# Patient Record
Sex: FEMALE | Race: WHITE | NOT HISPANIC OR LATINO | Employment: OTHER | URBAN - METROPOLITAN AREA
[De-identification: names, ages, dates, MRNs, and addresses within clinical notes are randomized per-mention and may not be internally consistent; named-entity substitution may affect disease eponyms.]

---

## 2023-03-28 ENCOUNTER — TELEPHONE (OUTPATIENT)
Dept: PRIMARY CARE | Facility: CLINIC | Age: 83
End: 2023-03-28
Payer: COMMERCIAL

## 2023-08-22 ENCOUNTER — OFFICE VISIT (OUTPATIENT)
Dept: PRIMARY CARE | Facility: CLINIC | Age: 83
End: 2023-08-22
Payer: MEDICARE

## 2023-08-22 VITALS
SYSTOLIC BLOOD PRESSURE: 160 MMHG | RESPIRATION RATE: 16 BRPM | DIASTOLIC BLOOD PRESSURE: 74 MMHG | WEIGHT: 157.4 LBS | TEMPERATURE: 97.2 F | BODY MASS INDEX: 27.89 KG/M2 | HEART RATE: 68 BPM | OXYGEN SATURATION: 97 % | HEIGHT: 63 IN

## 2023-08-22 DIAGNOSIS — G89.29 CHRONIC LEFT SHOULDER PAIN: ICD-10-CM

## 2023-08-22 DIAGNOSIS — M85.80 OSTEOPENIA, UNSPECIFIED LOCATION: ICD-10-CM

## 2023-08-22 DIAGNOSIS — Z00.00 ROUTINE GENERAL MEDICAL EXAMINATION AT HEALTH CARE FACILITY: ICD-10-CM

## 2023-08-22 DIAGNOSIS — M25.541 ARTHRALGIA OF BOTH HANDS: ICD-10-CM

## 2023-08-22 DIAGNOSIS — G89.29 CHRONIC RIGHT SHOULDER PAIN: ICD-10-CM

## 2023-08-22 DIAGNOSIS — M25.512 CHRONIC LEFT SHOULDER PAIN: ICD-10-CM

## 2023-08-22 DIAGNOSIS — E80.21 ACUTE INTERMITTENT (HEPATIC) PORPHYRIA (MULTI): Primary | ICD-10-CM

## 2023-08-22 DIAGNOSIS — I10 PRIMARY HYPERTENSION: ICD-10-CM

## 2023-08-22 DIAGNOSIS — R41.3 MEMORY LOSS: ICD-10-CM

## 2023-08-22 DIAGNOSIS — E78.2 MIXED HYPERLIPIDEMIA: ICD-10-CM

## 2023-08-22 DIAGNOSIS — Z00.00 MEDICARE ANNUAL WELLNESS VISIT, SUBSEQUENT: ICD-10-CM

## 2023-08-22 DIAGNOSIS — F32.1 CURRENT MODERATE EPISODE OF MAJOR DEPRESSIVE DISORDER WITHOUT PRIOR EPISODE (MULTI): ICD-10-CM

## 2023-08-22 DIAGNOSIS — M25.542 ARTHRALGIA OF BOTH HANDS: ICD-10-CM

## 2023-08-22 DIAGNOSIS — M25.511 CHRONIC RIGHT SHOULDER PAIN: ICD-10-CM

## 2023-08-22 PROBLEM — F41.9 ANXIETY: Status: ACTIVE | Noted: 2023-08-22

## 2023-08-22 PROBLEM — E87.5 HYPERKALEMIA: Status: ACTIVE | Noted: 2023-08-22

## 2023-08-22 PROBLEM — E78.5 HYPERLIPIDEMIA: Status: ACTIVE | Noted: 2021-08-31

## 2023-08-22 PROBLEM — F32.A DEPRESSION: Status: ACTIVE | Noted: 2023-08-22

## 2023-08-22 PROBLEM — M25.50 ARTHRALGIA: Status: ACTIVE | Noted: 2023-08-22

## 2023-08-22 PROBLEM — M79.7 FIBROMYALGIA: Status: ACTIVE | Noted: 2023-08-22

## 2023-08-22 PROCEDURE — 20610 DRAIN/INJ JOINT/BURSA W/O US: CPT | Performed by: FAMILY MEDICINE

## 2023-08-22 PROCEDURE — 1170F FXNL STATUS ASSESSED: CPT | Performed by: FAMILY MEDICINE

## 2023-08-22 PROCEDURE — 1160F RVW MEDS BY RX/DR IN RCRD: CPT | Performed by: FAMILY MEDICINE

## 2023-08-22 PROCEDURE — 3077F SYST BP >= 140 MM HG: CPT | Performed by: FAMILY MEDICINE

## 2023-08-22 PROCEDURE — 99213 OFFICE O/P EST LOW 20 MIN: CPT | Performed by: FAMILY MEDICINE

## 2023-08-22 PROCEDURE — 1159F MED LIST DOCD IN RCRD: CPT | Performed by: FAMILY MEDICINE

## 2023-08-22 PROCEDURE — 3078F DIAST BP <80 MM HG: CPT | Performed by: FAMILY MEDICINE

## 2023-08-22 PROCEDURE — 1036F TOBACCO NON-USER: CPT | Performed by: FAMILY MEDICINE

## 2023-08-22 RX ORDER — TRIAMCINOLONE ACETONIDE 40 MG/ML
2.5 INJECTION, SUSPENSION INTRA-ARTICULAR; INTRAMUSCULAR
Status: COMPLETED | OUTPATIENT
Start: 2023-08-22 | End: 2023-08-22

## 2023-08-22 RX ORDER — CYCLOBENZAPRINE HCL 5 MG
5 TABLET ORAL
COMMUNITY
Start: 2021-07-21

## 2023-08-22 RX ORDER — LIDOCAINE HYDROCHLORIDE 10 MG/ML
0.5 INJECTION INFILTRATION; PERINEURAL
Status: COMPLETED | OUTPATIENT
Start: 2023-08-22 | End: 2023-08-22

## 2023-08-22 RX ORDER — NABUMETONE 500 MG/1
500 TABLET, FILM COATED ORAL 2 TIMES DAILY
Qty: 180 TABLET | Refills: 3 | Status: SHIPPED | OUTPATIENT
Start: 2023-08-22 | End: 2024-08-21

## 2023-08-22 RX ORDER — LISINOPRIL 20 MG/1
20 TABLET ORAL DAILY
COMMUNITY
End: 2024-02-13 | Stop reason: SDUPTHER

## 2023-08-22 RX ORDER — DEXTROMETHORPHAN HYDROBROMIDE, GUAIFENESIN 5; 100 MG/5ML; MG/5ML
650 LIQUID ORAL EVERY 8 HOURS PRN
COMMUNITY

## 2023-08-22 RX ORDER — SIMVASTATIN 20 MG/1
20 TABLET, FILM COATED ORAL DAILY
COMMUNITY
End: 2024-02-13

## 2023-08-22 RX ORDER — MULTIVITAMIN
TABLET ORAL
COMMUNITY

## 2023-08-22 RX ADMIN — TRIAMCINOLONE ACETONIDE 2.5 MG: 40 INJECTION, SUSPENSION INTRA-ARTICULAR; INTRAMUSCULAR at 13:25

## 2023-08-22 RX ADMIN — LIDOCAINE HYDROCHLORIDE 0.5 ML: 10 INJECTION INFILTRATION; PERINEURAL at 13:23

## 2023-08-22 RX ADMIN — LIDOCAINE HYDROCHLORIDE 0.5 ML: 10 INJECTION INFILTRATION; PERINEURAL at 13:25

## 2023-08-22 RX ADMIN — TRIAMCINOLONE ACETONIDE 2.5 MG: 40 INJECTION, SUSPENSION INTRA-ARTICULAR; INTRAMUSCULAR at 13:23

## 2023-08-22 ASSESSMENT — PATIENT HEALTH QUESTIONNAIRE - PHQ9
1. LITTLE INTEREST OR PLEASURE IN DOING THINGS: NOT AT ALL
2. FEELING DOWN, DEPRESSED OR HOPELESS: NOT AT ALL
SUM OF ALL RESPONSES TO PHQ9 QUESTIONS 1 AND 2: 0
SUM OF ALL RESPONSES TO PHQ9 QUESTIONS 1 AND 2: 0
2. FEELING DOWN, DEPRESSED OR HOPELESS: NOT AT ALL
1. LITTLE INTEREST OR PLEASURE IN DOING THINGS: NOT AT ALL

## 2023-08-22 ASSESSMENT — ACTIVITIES OF DAILY LIVING (ADL)
MANAGING_FINANCES: INDEPENDENT
BATHING: INDEPENDENT
TAKING_MEDICATION: INDEPENDENT
DRESSING: INDEPENDENT
GROCERY_SHOPPING: INDEPENDENT
DOING_HOUSEWORK: INDEPENDENT
BATHING: INDEPENDENT
DRESSING: INDEPENDENT

## 2023-08-22 ASSESSMENT — ENCOUNTER SYMPTOMS
OCCASIONAL FEELINGS OF UNSTEADINESS: 0
DEPRESSION: 0
LOSS OF SENSATION IN FEET: 0

## 2023-08-22 ASSESSMENT — LIFESTYLE VARIABLES
HOW MANY STANDARD DRINKS CONTAINING ALCOHOL DO YOU HAVE ON A TYPICAL DAY: PATIENT DOES NOT DRINK
AUDIT-C TOTAL SCORE: 0
HOW OFTEN DO YOU HAVE A DRINK CONTAINING ALCOHOL: NEVER
HOW OFTEN DO YOU HAVE SIX OR MORE DRINKS ON ONE OCCASION: NEVER
SKIP TO QUESTIONS 9-10: 1

## 2023-08-22 NOTE — PROGRESS NOTES
"Subjective   Reason for Visit: Avani Abel is an 82 y.o. female here for a Medicare Wellness visit.     Past Medical, Surgical, and Family History reviewed and updated in chart.    Reviewed all medications by prescribing practitioner or clinical pharmacist (such as prescriptions, OTCs, herbal therapies and supplements) and documented in the medical record.    HPI    Patient Care Team:  Ninfa Lowry MD as PCP - General  Ninfa Lowry MD as PCP - [facility] [place] [place] [facility] Attributed Provider     Review of Systems    Objective   Vitals:  /74   Pulse 68   Temp 36.2 °C (97.2 °F) (Temporal)   Resp 16   Ht 1.605 m (5' 3.19\")   Wt 71.4 kg (157 lb 6.4 oz)   SpO2 97%   BMI 27.72 kg/m²       Physical Exam  Vitals reviewed.   Constitutional:       Appearance: Normal appearance.   HENT:      Head: Normocephalic and atraumatic.      Right Ear: Tympanic membrane normal.      Left Ear: Tympanic membrane normal.      Nose: Nose normal.      Mouth/Throat:      Mouth: Mucous membranes are moist.      Pharynx: Oropharynx is clear.   Eyes:      Extraocular Movements: Extraocular movements intact.      Conjunctiva/sclera: Conjunctivae normal.      Pupils: Pupils are equal, round, and reactive to light.   Cardiovascular:      Rate and Rhythm: Normal rate and regular rhythm.      Pulses: Normal pulses.      Heart sounds: Normal heart sounds.   Pulmonary:      Effort: Pulmonary effort is normal.      Breath sounds: Normal breath sounds.   Abdominal:      General: Abdomen is flat. Bowel sounds are normal.      Palpations: Abdomen is soft.   Musculoskeletal:         General: Normal range of motion.      Cervical back: Normal range of motion and neck supple.   Skin:     General: Skin is warm and dry.      Capillary Refill: Capillary refill takes less than 2 seconds.   Neurological:      General: No focal deficit present.      Mental Status: She is alert and oriented to person, place, and time.   Psychiatric:         Mood and " Affect: Mood normal.         Behavior: Behavior normal.         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 Assessment: stable on Rx         Relevant Orders    CBC and Auto Differential    Comprehensive Metabolic Panel    Lipid Panel    TSH with reflex to Free T4 if abnormal    Memory loss    Osteopenia    Medicare annual wellness visit, subsequent     Other Visit Diagnoses       Primary hypertension        Relevant Orders    CBC and Auto Differential    Comprehensive Metabolic Panel    Lipid Panel    TSH with reflex to Free T4 if abnormal    Routine general medical examination at health care facility              Patient ID: Avani Abel is a 82 y.o. female.    Joint Injection Large/Arthrocentesis: R glenohumeral on 8/22/2023 1:23 PM  Indications: pain  Details: 25 G needle, posterior approach  Medications: 2.5 mg triamcinolone acetonide 40 mg/mL; 0.5 mL lidocaine 10 mg/mL (1 %)  Consent was given by the patient. Immediately prior to procedure a time out was called to verify the correct patient, procedure, equipment, support staff and site/side marked as required. Patient was prepped and draped in the usual sterile fashion.       Joint Injection Large/Arthrocentesis: L glenohumeral on 8/22/2023 1:25 PM  Indications: pain  Details: 25 G needle, posterior approach  Medications: 2.5 mg triamcinolone acetonide 40 mg/mL; 0.5 mL lidocaine 10 mg/mL (1 %)  Outcome: tolerated well, no immediate complications  Procedure, treatment alternatives, risks and benefits explained, specific risks discussed. Consent was  given by the patient. Immediately prior to procedure a time out was called to verify the correct patient, procedure, equipment, support staff and site/side marked as required. Patient was prepped and draped in the usual sterile fashion.

## 2023-10-02 ENCOUNTER — LAB (OUTPATIENT)
Dept: LAB | Facility: LAB | Age: 83
End: 2023-10-02
Payer: MEDICARE

## 2023-10-02 DIAGNOSIS — E80.21 ACUTE INTERMITTENT (HEPATIC) PORPHYRIA (MULTI): ICD-10-CM

## 2023-10-02 DIAGNOSIS — I10 PRIMARY HYPERTENSION: ICD-10-CM

## 2023-10-02 DIAGNOSIS — M25.542 ARTHRALGIA OF BOTH HANDS: ICD-10-CM

## 2023-10-02 DIAGNOSIS — E78.2 MIXED HYPERLIPIDEMIA: ICD-10-CM

## 2023-10-02 DIAGNOSIS — M25.541 ARTHRALGIA OF BOTH HANDS: ICD-10-CM

## 2023-10-02 LAB
ALBUMIN SERPL BCP-MCNC: 4.2 G/DL (ref 3.4–5)
ALP SERPL-CCNC: 59 U/L (ref 33–136)
ALT SERPL W P-5'-P-CCNC: 18 U/L (ref 7–45)
ANION GAP SERPL CALC-SCNC: 13 MMOL/L (ref 10–20)
AST SERPL W P-5'-P-CCNC: 16 U/L (ref 9–39)
BASOPHILS # BLD AUTO: 0.07 X10*3/UL (ref 0–0.1)
BASOPHILS NFR BLD AUTO: 0.8 %
BILIRUB SERPL-MCNC: 0.5 MG/DL (ref 0–1.2)
BUN SERPL-MCNC: 19 MG/DL (ref 6–23)
CALCIUM SERPL-MCNC: 9.8 MG/DL (ref 8.6–10.3)
CHLORIDE SERPL-SCNC: 105 MMOL/L (ref 98–107)
CHOLEST SERPL-MCNC: 172 MG/DL (ref 0–199)
CHOLESTEROL/HDL RATIO: 3.2
CO2 SERPL-SCNC: 29 MMOL/L (ref 21–32)
CREAT SERPL-MCNC: 1.06 MG/DL (ref 0.5–1.05)
CRP SERPL-MCNC: 1.2 MG/DL
EOSINOPHIL # BLD AUTO: 0.66 X10*3/UL (ref 0–0.4)
EOSINOPHIL NFR BLD AUTO: 7.6 %
ERYTHROCYTE [DISTWIDTH] IN BLOOD BY AUTOMATED COUNT: 12.8 % (ref 11.5–14.5)
ERYTHROCYTE [SEDIMENTATION RATE] IN BLOOD BY WESTERGREN METHOD: 62 MM/H (ref 0–30)
GFR SERPL CREATININE-BSD FRML MDRD: 53 ML/MIN/1.73M*2
GLUCOSE SERPL-MCNC: 90 MG/DL (ref 74–99)
HCT VFR BLD AUTO: 45.6 % (ref 36–46)
HDLC SERPL-MCNC: 53.2 MG/DL
HGB BLD-MCNC: 14.6 G/DL (ref 12–16)
IMM GRANULOCYTES # BLD AUTO: 0.05 X10*3/UL (ref 0–0.5)
IMM GRANULOCYTES NFR BLD AUTO: 0.6 % (ref 0–0.9)
LDLC SERPL CALC-MCNC: 80 MG/DL (ref 140–190)
LYMPHOCYTES # BLD AUTO: 1.71 X10*3/UL (ref 0.8–3)
LYMPHOCYTES NFR BLD AUTO: 19.6 %
MCH RBC QN AUTO: 29.5 PG (ref 26–34)
MCHC RBC AUTO-ENTMCNC: 32 G/DL (ref 32–36)
MCV RBC AUTO: 92 FL (ref 80–100)
MONOCYTES # BLD AUTO: 0.9 X10*3/UL (ref 0.05–0.8)
MONOCYTES NFR BLD AUTO: 10.3 %
NEUTROPHILS # BLD AUTO: 5.32 X10*3/UL (ref 1.6–5.5)
NEUTROPHILS NFR BLD AUTO: 61.1 %
NON HDL CHOLESTEROL: 119 MG/DL (ref 0–149)
NRBC BLD-RTO: 0.2 /100 WBCS (ref 0–0)
PLATELET # BLD AUTO: 354 X10*3/UL (ref 150–450)
PMV BLD AUTO: 9.1 FL (ref 7.5–11.5)
POTASSIUM SERPL-SCNC: 5.5 MMOL/L (ref 3.5–5.3)
PROT SERPL-MCNC: 7 G/DL (ref 6.4–8.2)
RBC # BLD AUTO: 4.95 X10*6/UL (ref 4–5.2)
RHEUMATOID FACT SER NEPH-ACNC: <10 IU/ML (ref 0–15)
SODIUM SERPL-SCNC: 141 MMOL/L (ref 136–145)
TRIGL SERPL-MCNC: 196 MG/DL (ref 0–149)
TSH SERPL-ACNC: 1.1 MIU/L (ref 0.44–3.98)
URATE SERPL-MCNC: 5.6 MG/DL (ref 2.3–6.7)
VLDL: 39 MG/DL (ref 0–40)
WBC # BLD AUTO: 8.7 X10*3/UL (ref 4.4–11.3)

## 2023-10-02 PROCEDURE — 85652 RBC SED RATE AUTOMATED: CPT

## 2023-10-02 PROCEDURE — 80050 GENERAL HEALTH PANEL: CPT

## 2023-10-02 PROCEDURE — 80061 LIPID PANEL: CPT

## 2023-10-02 PROCEDURE — 86140 C-REACTIVE PROTEIN: CPT

## 2023-10-02 PROCEDURE — 84550 ASSAY OF BLOOD/URIC ACID: CPT

## 2023-10-02 PROCEDURE — 36415 COLL VENOUS BLD VENIPUNCTURE: CPT

## 2023-10-04 LAB — ANA SER QL HEP2 SUBST: NEGATIVE

## 2023-10-11 NOTE — RESULT ENCOUNTER NOTE
Labs look good, except your markers for inflammation were up, but your testing for rheumatoid arthritis and lupus were negative. I would consider a referral to rheumatology to see if there is any additional testing warranted at this time. Would you be agreeable? Let me know!

## 2024-01-03 ENCOUNTER — OFFICE VISIT (OUTPATIENT)
Dept: PRIMARY CARE | Facility: CLINIC | Age: 84
End: 2024-01-03
Payer: MEDICARE

## 2024-01-03 VITALS
HEIGHT: 63 IN | HEART RATE: 97 BPM | RESPIRATION RATE: 16 BRPM | WEIGHT: 155.8 LBS | BODY MASS INDEX: 27.61 KG/M2 | OXYGEN SATURATION: 97 % | SYSTOLIC BLOOD PRESSURE: 142 MMHG | DIASTOLIC BLOOD PRESSURE: 72 MMHG

## 2024-01-03 DIAGNOSIS — E80.21 ACUTE INTERMITTENT (HEPATIC) PORPHYRIA (MULTI): ICD-10-CM

## 2024-01-03 DIAGNOSIS — M54.6 ACUTE RIGHT-SIDED THORACIC BACK PAIN: ICD-10-CM

## 2024-01-03 DIAGNOSIS — B02.23 POST-HERPETIC POLYNEUROPATHY: ICD-10-CM

## 2024-01-03 DIAGNOSIS — F32.1 CURRENT MODERATE EPISODE OF MAJOR DEPRESSIVE DISORDER WITHOUT PRIOR EPISODE (MULTI): ICD-10-CM

## 2024-01-03 DIAGNOSIS — B02.8 HERPES ZOSTER WITH OTHER COMPLICATION: Primary | ICD-10-CM

## 2024-01-03 DIAGNOSIS — N18.31 STAGE 3A CHRONIC KIDNEY DISEASE (MULTI): ICD-10-CM

## 2024-01-03 PROBLEM — B02.9 SHINGLES: Status: ACTIVE | Noted: 2024-01-03

## 2024-01-03 PROCEDURE — 1159F MED LIST DOCD IN RCRD: CPT | Performed by: FAMILY MEDICINE

## 2024-01-03 PROCEDURE — 99213 OFFICE O/P EST LOW 20 MIN: CPT | Performed by: FAMILY MEDICINE

## 2024-01-03 PROCEDURE — 1036F TOBACCO NON-USER: CPT | Performed by: FAMILY MEDICINE

## 2024-01-03 RX ORDER — VALACYCLOVIR HYDROCHLORIDE 1 G/1
1000 TABLET, FILM COATED ORAL 3 TIMES DAILY
COMMUNITY
Start: 2023-12-26

## 2024-01-03 RX ORDER — GABAPENTIN 100 MG/1
100 CAPSULE ORAL 3 TIMES DAILY
Qty: 90 CAPSULE | Refills: 0 | Status: SHIPPED | OUTPATIENT
Start: 2024-01-03 | End: 2024-02-02

## 2024-01-03 RX ORDER — OXYCODONE AND ACETAMINOPHEN 5; 325 MG/1; MG/1
1 TABLET ORAL EVERY 6 HOURS PRN
Qty: 20 TABLET | Refills: 0 | Status: SHIPPED | OUTPATIENT
Start: 2024-01-03 | End: 2024-01-08

## 2024-01-03 RX ORDER — PREDNISONE 20 MG/1
TABLET ORAL
Qty: 18 TABLET | Refills: 0 | OUTPATIENT
Start: 2024-01-03 | End: 2024-03-04

## 2024-01-03 ASSESSMENT — ENCOUNTER SYMPTOMS: BACK PAIN: 1

## 2024-01-03 NOTE — PATIENT INSTRUCTIONS
Finish Valtrex,taking more won't make things any different.   Prednisone taper. Hold meloxicam during this time, Your shoulders should feel fine with this medication,   Gabapentin 100 mg three times daily for nerve pain.   Percocet for five days.   You may put lidocaine cream on the areas that are tender, it is over the counter.     I would like to see you improve over the next few days, Please call if not!

## 2024-01-03 NOTE — PROGRESS NOTES
Subjective   Patient ID: Avani Abel is a 83 y.o. female.    Rash      83 year old female for follow up- she went to Urgent care 12./22 and was diagnosed with shingles and was given Valtrex. States pain in not good, under right breast , and in shoulder, No congestion. Weight stable last GFR 53. Pain 10/10  Review of Systems   Musculoskeletal:  Positive for back pain.   Skin:  Positive for rash.   Pain 10/10. Cannot sleep.     Objective   Physical Exam  Vitals reviewed.   Constitutional:       Appearance: Normal appearance.   HENT:      Head: Normocephalic and atraumatic.      Right Ear: Tympanic membrane normal.      Left Ear: Tympanic membrane normal.      Nose: Nose normal.      Mouth/Throat:      Mouth: Mucous membranes are moist.      Pharynx: Oropharynx is clear.   Eyes:      Extraocular Movements: Extraocular movements intact.      Conjunctiva/sclera: Conjunctivae normal.      Pupils: Pupils are equal, round, and reactive to light.   Cardiovascular:      Rate and Rhythm: Normal rate and regular rhythm.      Pulses: Normal pulses.      Heart sounds: Normal heart sounds.   Pulmonary:      Effort: Pulmonary effort is normal.      Breath sounds: Normal breath sounds.   Abdominal:      General: Abdomen is flat. Bowel sounds are normal.      Palpations: Abdomen is soft.   Musculoskeletal:         General: Normal range of motion.      Cervical back: Normal range of motion and neck supple.   Skin:     General: Skin is warm and dry.      Capillary Refill: Capillary refill takes less than 2 seconds.      Findings: Rash present.      Comments: Right side thoracic spine and under right breast radiating to nipple   Neurological:      General: No focal deficit present.      Mental Status: She is alert and oriented to person, place, and time.   Psychiatric:         Mood and Affect: Mood normal.         Behavior: Behavior normal.     Pain is 10/10    Assessment/Plan   Diagnoses and all orders for this visit:  Herpes  zoster with other complication  -     predniSONE (Deltasone) 20 mg tablet; Take 3 tabs (60mg) daily for 3 days, then take 2 tabs (40mg) daily for 3 days, then take 1 tab (20mg) daily for 3 days.  -     gabapentin (Neurontin) 100 mg capsule; Take 1 capsule (100 mg) by mouth 3 times a day.  -     oxyCODONE-acetaminophen (Percocet) 5-325 mg tablet; Take 1 tablet by mouth every 6 hours if needed for severe pain (7 - 10) for up to 5 days.  Post-herpetic polyneuropathy  -     predniSONE (Deltasone) 20 mg tablet; Take 3 tabs (60mg) daily for 3 days, then take 2 tabs (40mg) daily for 3 days, then take 1 tab (20mg) daily for 3 days.  -     gabapentin (Neurontin) 100 mg capsule; Take 1 capsule (100 mg) by mouth 3 times a day.  -     oxyCODONE-acetaminophen (Percocet) 5-325 mg tablet; Take 1 tablet by mouth every 6 hours if needed for severe pain (7 - 10) for up to 5 days.  Acute right-sided thoracic back pain  -     oxyCODONE-acetaminophen (Percocet) 5-325 mg tablet; Take 1 tablet by mouth every 6 hours if needed for severe pain (7 - 10) for up to 5 days.  Finish Valtrex,taking more won't make things any different.   Prednisone taper. Hold meloxicam during this time, Your shoulders should feel fine with this medication,   Gabapentin 100 mg three times daily for nerve pain.   Percocet for five days.   You may put lidocaine cream on the areas that are tender, it is over the counter.     I would like to see you improve over the next few days, Please call if not!

## 2024-02-13 ENCOUNTER — TELEPHONE (OUTPATIENT)
Dept: PRIMARY CARE | Facility: CLINIC | Age: 84
End: 2024-02-13
Payer: COMMERCIAL

## 2024-02-13 NOTE — TELEPHONE ENCOUNTER
Refill request:    Simvastatin  20mg every day  Lisinopril 20mg every day     Pharmacy: [facility]    She may want to cancel her 2/22 appointment, if she vasquez get these refilled. Please if there are any issues.

## 2024-02-13 NOTE — TELEPHONE ENCOUNTER
Patient requesting to switch appointment date and times with . Wants to make sure that's alright please advise     Hua: 4:20 2/19   Umm: 11:20 2/22   
no

## 2024-02-19 ENCOUNTER — APPOINTMENT (OUTPATIENT)
Dept: PRIMARY CARE | Facility: CLINIC | Age: 84
End: 2024-02-19
Payer: MEDICARE

## 2024-02-22 ENCOUNTER — APPOINTMENT (OUTPATIENT)
Dept: PRIMARY CARE | Facility: CLINIC | Age: 84
End: 2024-02-22
Payer: MEDICARE

## 2024-02-23 ENCOUNTER — APPOINTMENT (OUTPATIENT)
Dept: PRIMARY CARE | Facility: CLINIC | Age: 84
End: 2024-02-23
Payer: COMMERCIAL

## 2024-03-04 ENCOUNTER — TELEPHONE (OUTPATIENT)
Dept: PRIMARY CARE | Facility: CLINIC | Age: 84
End: 2024-03-04
Payer: COMMERCIAL

## 2024-03-04 ENCOUNTER — HOSPITAL ENCOUNTER (EMERGENCY)
Facility: HOSPITAL | Age: 84
Discharge: HOME | End: 2024-03-04
Payer: MEDICARE

## 2024-03-04 VITALS
BODY MASS INDEX: 25.61 KG/M2 | HEIGHT: 64 IN | WEIGHT: 150 LBS | HEART RATE: 67 BPM | RESPIRATION RATE: 18 BRPM | OXYGEN SATURATION: 97 % | TEMPERATURE: 98 F | DIASTOLIC BLOOD PRESSURE: 82 MMHG | SYSTOLIC BLOOD PRESSURE: 174 MMHG

## 2024-03-04 DIAGNOSIS — R21 RASH: Primary | ICD-10-CM

## 2024-03-04 PROCEDURE — 99283 EMERGENCY DEPT VISIT LOW MDM: CPT

## 2024-03-04 RX ORDER — PREDNISONE 20 MG/1
40 TABLET ORAL DAILY
Qty: 10 TABLET | Refills: 0 | Status: SHIPPED | OUTPATIENT
Start: 2024-03-04 | End: 2024-03-09

## 2024-03-04 RX ORDER — CETIRIZINE HYDROCHLORIDE 10 MG/1
10 TABLET ORAL DAILY
Qty: 5 TABLET | Refills: 0 | Status: SHIPPED | OUTPATIENT
Start: 2024-03-04 | End: 2024-03-09

## 2024-03-04 RX ORDER — FAMOTIDINE 20 MG/1
20 TABLET, FILM COATED ORAL 2 TIMES DAILY
Qty: 10 TABLET | Refills: 0 | Status: SHIPPED | OUTPATIENT
Start: 2024-03-04 | End: 2024-03-04 | Stop reason: SDUPTHER

## 2024-03-04 RX ORDER — FAMOTIDINE 20 MG/1
20 TABLET, FILM COATED ORAL 2 TIMES DAILY
Qty: 10 TABLET | Refills: 0 | Status: SHIPPED | OUTPATIENT
Start: 2024-03-04 | End: 2024-03-09

## 2024-03-04 ASSESSMENT — COLUMBIA-SUICIDE SEVERITY RATING SCALE - C-SSRS
6. HAVE YOU EVER DONE ANYTHING, STARTED TO DO ANYTHING, OR PREPARED TO DO ANYTHING TO END YOUR LIFE?: NO
1. IN THE PAST MONTH, HAVE YOU WISHED YOU WERE DEAD OR WISHED YOU COULD GO TO SLEEP AND NOT WAKE UP?: NO
2. HAVE YOU ACTUALLY HAD ANY THOUGHTS OF KILLING YOURSELF?: NO

## 2024-03-04 ASSESSMENT — PAIN SCALES - GENERAL: PAINLEVEL_OUTOF10: 0 - NO PAIN

## 2024-03-04 ASSESSMENT — PAIN DESCRIPTION - LOCATION: LOCATION: CHEST

## 2024-03-04 ASSESSMENT — PAIN - FUNCTIONAL ASSESSMENT: PAIN_FUNCTIONAL_ASSESSMENT: 0-10

## 2024-03-04 NOTE — ED PROVIDER NOTES
HPI   Chief Complaint   Patient presents with    Rash     Pt had a rash. Pt states it is now subsiding. Pt has Porphyria which may have caused this       This is a 83-year-old  female presenting to the emergency room with complaints of a rash.  The patient reports that she ate a piece of buttered banana bread and noticed that her face felt like it was burning.  She looked in the mirror and noted that was beet red.  The rash started to spread down her chest.  She felt like she was having a hard time breathing.  Her  reports that her symptoms have improved.  She did not take any medicines prior to arrival for her symptoms.  The patient does not have sensitive skin.  She denies eating anything new or different.  She denies any new perfumes, lotions, or detergents.  She reports she has not had any allergic reaction similar.                          No data recorded                   Patient History   Past Medical History:   Diagnosis Date    Other bursitis of elbow, right elbow 09/25/2017    Radiohumeral bursitis of both elbows    Other cervical disc degeneration, unspecified cervical region     Degeneration of cervical intervertebral disc    Other conditions influencing health status     Mammogram    Other intervertebral disc degeneration, lumbar region     Disc degeneration, lumbar    Pain in left hip 05/25/2016    Left hip pain     Past Surgical History:   Procedure Laterality Date    APPENDECTOMY  11/15/2013    Appendectomy    CHOLECYSTECTOMY  11/15/2013    Cholecystectomy    TONSILLECTOMY  11/15/2013    Tonsillectomy    TOTAL ABDOMINAL HYSTERECTOMY W/ BILATERAL SALPINGOOPHORECTOMY  11/15/2013    Total Abdominal Hysterectomy With Removal Of Both Ovaries    TOTAL KNEE ARTHROPLASTY  11/15/2013    Knee Replacement     No family history on file.  Social History     Tobacco Use    Smoking status: Never     Passive exposure: Current    Smokeless tobacco: Never   Vaping Use    Vaping Use: Never used    Substance Use Topics    Alcohol use: Not Currently    Drug use: Never       Physical Exam   ED Triage Vitals [03/04/24 1123]   Temperature Heart Rate Respirations BP   36.7 °C (98 °F) 97 (!) 22 (!) 195/101      Pulse Ox Temp src Heart Rate Source Patient Position   96 % -- -- Sitting      BP Location FiO2 (%)     Right arm --       Physical Exam  Vitals and nursing note reviewed.   Constitutional:       Appearance: Normal appearance. She is normal weight.   HENT:      Head: Normocephalic and atraumatic.      Right Ear: Tympanic membrane normal.      Left Ear: Tympanic membrane normal.      Nose: Nose normal.      Mouth/Throat:      Mouth: Mucous membranes are moist.      Pharynx: Oropharynx is clear.      Comments: No angioedema.  Airway is widely patent.  Eyes:      Extraocular Movements: Extraocular movements intact.      Conjunctiva/sclera: Conjunctivae normal.      Pupils: Pupils are equal, round, and reactive to light.   Cardiovascular:      Rate and Rhythm: Normal rate and regular rhythm.      Pulses: Normal pulses.   Pulmonary:      Effort: Pulmonary effort is normal.      Breath sounds: Normal breath sounds.   Genitourinary:     Comments: No CVA tenderness or pubic pain.  Musculoskeletal:         General: Normal range of motion.   Skin:     General: Skin is warm and dry.      Capillary Refill: Capillary refill takes less than 2 seconds.      Comments: The patient has mild erythema noted to the left cheek.   Neurological:      General: No focal deficit present.      Mental Status: She is alert and oriented to person, place, and time.   Psychiatric:         Mood and Affect: Mood normal.         Judgment: Judgment normal.         ED Course & MDM   Diagnoses as of 03/04/24 1307   Rash       Medical Decision Making  Patient was seen and evaluated by the nurse practitioner, Stephania Myo.  The patient is presenting to the emergency room with complaints of an allergic reaction.  The patient has some mild redness  noted to the left cheek.  Her  reports that her symptoms have improved dramatically.  The patient cannot name any specific trigger.  The patient is not showing any signs of anaphylaxis.  She was provided prescription for Zyrtec, prednisone, and Pepcid for home administration.  She was informed that she may need allergy testing if she continues to have persistent symptomatology.  The patient was discharged in stable condition with computer discharge instructions given.  She is to return if worse in any way.        Procedure  Procedures     MARIANO Dempsey-CNP  03/04/24 1971

## 2024-04-24 ENCOUNTER — APPOINTMENT (OUTPATIENT)
Dept: PRIMARY CARE | Facility: CLINIC | Age: 84
End: 2024-04-24
Payer: MEDICARE

## 2024-06-06 ENCOUNTER — OFFICE VISIT (OUTPATIENT)
Dept: PRIMARY CARE | Facility: CLINIC | Age: 84
End: 2024-06-06
Payer: MEDICARE

## 2024-06-06 VITALS
HEART RATE: 92 BPM | DIASTOLIC BLOOD PRESSURE: 80 MMHG | SYSTOLIC BLOOD PRESSURE: 142 MMHG | BODY MASS INDEX: 27.82 KG/M2 | HEIGHT: 63 IN | OXYGEN SATURATION: 96 % | WEIGHT: 157 LBS

## 2024-06-06 DIAGNOSIS — M25.511 CHRONIC RIGHT SHOULDER PAIN: ICD-10-CM

## 2024-06-06 DIAGNOSIS — G89.29 CHRONIC LEFT SHOULDER PAIN: Primary | ICD-10-CM

## 2024-06-06 DIAGNOSIS — G89.29 CHRONIC RIGHT SHOULDER PAIN: ICD-10-CM

## 2024-06-06 DIAGNOSIS — M25.512 CHRONIC LEFT SHOULDER PAIN: Primary | ICD-10-CM

## 2024-06-06 PROCEDURE — 1160F RVW MEDS BY RX/DR IN RCRD: CPT

## 2024-06-06 PROCEDURE — 1159F MED LIST DOCD IN RCRD: CPT

## 2024-06-06 PROCEDURE — 20610 DRAIN/INJ JOINT/BURSA W/O US: CPT

## 2024-06-06 PROCEDURE — 99213 OFFICE O/P EST LOW 20 MIN: CPT

## 2024-06-06 PROCEDURE — 1157F ADVNC CARE PLAN IN RCRD: CPT

## 2024-06-06 RX ADMIN — TRIAMCINOLONE ACETONIDE 2.5 MG: 40 INJECTION, SUSPENSION INTRA-ARTICULAR; INTRAMUSCULAR at 17:48

## 2024-06-06 RX ADMIN — LIDOCAINE HYDROCHLORIDE 0.5 ML: 10 INJECTION INFILTRATION; PERINEURAL at 17:48

## 2024-06-06 ASSESSMENT — ENCOUNTER SYMPTOMS
LOSS OF SENSATION IN FEET: 0
OCCASIONAL FEELINGS OF UNSTEADINESS: 0
DEPRESSION: 0

## 2024-06-06 ASSESSMENT — PATIENT HEALTH QUESTIONNAIRE - PHQ9
2. FEELING DOWN, DEPRESSED OR HOPELESS: NOT AT ALL
1. LITTLE INTEREST OR PLEASURE IN DOING THINGS: NOT AT ALL
SUM OF ALL RESPONSES TO PHQ9 QUESTIONS 1 AND 2: 0

## 2024-06-06 NOTE — PROGRESS NOTES
"Patient ID: Avani Abel is a 83 y.o. female.  Patient in office for bilateral shoulder steroid injections.   Joint Injection Large/Arthrocentesis: bilateral glenohumeral on 6/6/2024 5:48 PM  Indications: pain  Details: 25 G needle, posterior approach  Medications (Right): 2.5 mg triamcinolone acetonide 40 mg/mL; 0.5 mL lidocaine 10 mg/mL (1 %)  Medications (Left): 2.5 mg triamcinolone acetonide 40 mg/mL; 0.5 mL lidocaine 10 mg/mL (1 %)    Patient tolerate right injection well, did state had 5/10 pain with left injection. ROM intact after injection.  Patient left stating pain was better.   Procedure, treatment alternatives, risks and benefits explained, specific risks discussed. Consent was given by the patient. Immediately prior to procedure a time out was called to verify the correct patient, procedure, equipment, support staff and site/side marked as required. Patient was prepped and draped in the usual sterile fashion.       Subjective   Patient ID: Avani Abel is a 83 y.o. female who presents for Injections (Bilateral shoulder steroid injections).    Past Medical, Surgical, and Family History reviewed and updated in chart.     Reviewed all medications by prescribing practitioner or clinical pharmacist (such as prescriptions, OTCs, herbal therapies and supplements) and documented in the medical record.    HPI   Patient in office for bilateral shoulder joint injection. Left shoulder worse then right.     Review of Systems   Musculoskeletal:  Positive for arthralgias.        Bilateral shoulder pain       Objective   /80   Pulse 92   Ht 1.6 m (5' 2.99\")   Wt 71.2 kg (157 lb)   SpO2 96%   BMI 27.82 kg/m²     Physical Exam  Musculoskeletal:      Left shoulder: Decreased range of motion.         Assessment/Plan   Problem List Items Addressed This Visit       Chronic left shoulder pain - Primary    Relevant Orders    Joint Injection Large/Arthrocentesis: bilateral glenohumeral    Chronic " right shoulder pain    Relevant Orders    Joint Injection Large/Arthrocentesis: bilateral glenohumeral

## 2024-06-07 RX ORDER — TRIAMCINOLONE ACETONIDE 40 MG/ML
2.5 INJECTION, SUSPENSION INTRA-ARTICULAR; INTRAMUSCULAR
Status: COMPLETED | OUTPATIENT
Start: 2024-06-06 | End: 2024-06-06

## 2024-06-07 RX ORDER — LIDOCAINE HYDROCHLORIDE 10 MG/ML
0.5 INJECTION INFILTRATION; PERINEURAL
Status: COMPLETED | OUTPATIENT
Start: 2024-06-06 | End: 2024-06-06

## 2024-06-07 RX ORDER — TRIAMCINOLONE ACETONIDE 40 MG/ML
40 INJECTION, SUSPENSION INTRA-ARTICULAR; INTRAMUSCULAR ONCE
Status: SHIPPED | OUTPATIENT
Start: 2024-06-07

## 2024-06-07 ASSESSMENT — ENCOUNTER SYMPTOMS: ARTHRALGIAS: 1

## 2024-08-22 PROBLEM — M50.30 DEGENERATION OF INTERVERTEBRAL DISC OF CERVICAL REGION: Status: ACTIVE | Noted: 2024-08-22

## 2024-08-22 PROBLEM — I10 HYPERTENSION: Status: ACTIVE | Noted: 2023-10-02

## 2024-08-22 PROBLEM — H65.21 RIGHT CHRONIC SEROUS OTITIS MEDIA: Status: ACTIVE | Noted: 2024-08-22

## 2024-08-22 PROBLEM — L98.9 LESION OF FACE: Status: ACTIVE | Noted: 2024-08-22

## 2024-08-22 PROBLEM — R20.0 NUMBNESS OF FINGERS OF BOTH HANDS: Status: ACTIVE | Noted: 2024-08-22

## 2024-08-22 PROBLEM — M17.12 OSTEOARTHRITIS OF LEFT KNEE: Status: ACTIVE | Noted: 2024-08-22

## 2024-08-22 PROBLEM — H66.91 ACUTE RIGHT OTITIS MEDIA: Status: ACTIVE | Noted: 2024-08-22

## 2024-08-22 PROBLEM — H69.90 EUSTACHIAN TUBE DYSFUNCTION: Status: ACTIVE | Noted: 2024-08-22

## 2024-08-22 PROBLEM — Z96.652 HISTORY OF KNEE REPLACEMENT, TOTAL, LEFT: Status: ACTIVE | Noted: 2024-08-22

## 2024-08-22 PROBLEM — G47.00 INSOMNIA: Status: ACTIVE | Noted: 2024-08-22

## 2024-08-22 PROBLEM — M54.50 LOW BACK PAIN SYNDROME: Status: ACTIVE | Noted: 2024-08-22

## 2024-08-22 PROBLEM — T75.3XXA MOTION SICKNESS: Status: ACTIVE | Noted: 2024-08-22

## 2024-08-22 PROBLEM — H69.90 DYSFUNCTION OF EUSTACHIAN TUBE: Status: ACTIVE | Noted: 2024-08-22

## 2024-08-22 PROBLEM — B96.89 ACUTE BACTERIAL SINUSITIS: Status: ACTIVE | Noted: 2024-08-22

## 2024-08-22 PROBLEM — L98.9 FACIAL LESION: Status: ACTIVE | Noted: 2024-08-22

## 2024-08-22 PROBLEM — M75.50 BURSITIS OF SHOULDER: Status: ACTIVE | Noted: 2024-08-22

## 2024-08-22 PROBLEM — H60.90 OTITIS EXTERNA: Status: ACTIVE | Noted: 2024-08-22

## 2024-08-22 PROBLEM — H26.9 CATARACT, RIGHT: Status: ACTIVE | Noted: 2024-08-22

## 2024-08-22 PROBLEM — L60.0 INGROWN NAIL OF GREAT TOE OF RIGHT FOOT: Status: ACTIVE | Noted: 2024-08-22

## 2024-08-22 PROBLEM — M62.838 MUSCLE SPASM: Status: ACTIVE | Noted: 2024-08-22

## 2024-08-22 PROBLEM — J01.90 ACUTE BACTERIAL SINUSITIS: Status: ACTIVE | Noted: 2024-08-22

## 2024-08-22 PROBLEM — I10 PRIMARY HYPERTENSION: Status: ACTIVE | Noted: 2021-08-31

## 2024-08-23 ENCOUNTER — TELEPHONE (OUTPATIENT)
Dept: PRIMARY CARE | Facility: CLINIC | Age: 84
End: 2024-08-23
Payer: COMMERCIAL

## 2024-08-23 DIAGNOSIS — N18.31 STAGE 3A CHRONIC KIDNEY DISEASE (MULTI): ICD-10-CM

## 2024-08-23 DIAGNOSIS — E78.2 MIXED HYPERLIPIDEMIA: ICD-10-CM

## 2024-08-23 RX ORDER — SIMVASTATIN 20 MG/1
20 TABLET, FILM COATED ORAL DAILY
Qty: 90 TABLET | Refills: 0 | Status: SHIPPED | OUTPATIENT
Start: 2024-08-23

## 2024-08-23 RX ORDER — LISINOPRIL 20 MG/1
20 TABLET ORAL DAILY
Qty: 90 TABLET | Refills: 0 | Status: SHIPPED | OUTPATIENT
Start: 2024-08-23

## 2024-08-23 NOTE — TELEPHONE ENCOUNTER
Rx Refill Request Telephone Encounter    Name:  Avani Abel  :  [id number]  Medication Name:  lisinopril  20 mg        Take 1 tablet by mouth once daily  Specific Pharmacy location:  [facility]  Date of last appointment:  2024  Rx Refill Request Telephone Encounter    Name:  Avani Abel  :  [id number]  Medication Name:  Simvastatin  20 mg        Take 1 tablet by mouth once daily

## 2024-08-23 NOTE — TELEPHONE ENCOUNTER
Patient had to cancel her 8/26 [facility] [place] appointment and her daughter wasn't happy about next appointment available is in December. She said that's not acceptable and she needs to be seen sooner. I told her I would send message to you to see when I can put her on your schedule. Thanks!

## 2024-08-26 ENCOUNTER — APPOINTMENT (OUTPATIENT)
Dept: PRIMARY CARE | Facility: CLINIC | Age: 84
End: 2024-08-26
Payer: MEDICARE

## 2024-08-30 NOTE — TELEPHONE ENCOUNTER
Avani called back . She is scheduled 12-2-24 for medicare wellness & appointment 9/13 with [facility] for lump on collarbone

## 2024-09-11 DIAGNOSIS — M25.511 PAIN OF BOTH SHOULDER JOINTS: ICD-10-CM

## 2024-09-11 DIAGNOSIS — G89.29 CHRONIC LEFT SHOULDER PAIN: Primary | ICD-10-CM

## 2024-09-11 DIAGNOSIS — M25.512 CHRONIC LEFT SHOULDER PAIN: Primary | ICD-10-CM

## 2024-09-11 DIAGNOSIS — M25.512 PAIN OF BOTH SHOULDER JOINTS: ICD-10-CM

## 2024-09-11 DIAGNOSIS — G89.29 CHRONIC RIGHT SHOULDER PAIN: ICD-10-CM

## 2024-09-11 DIAGNOSIS — M25.511 CHRONIC RIGHT SHOULDER PAIN: ICD-10-CM

## 2024-09-13 ENCOUNTER — APPOINTMENT (OUTPATIENT)
Dept: PRIMARY CARE | Facility: CLINIC | Age: 84
End: 2024-09-13
Payer: COMMERCIAL

## 2024-09-13 ENCOUNTER — LAB (OUTPATIENT)
Dept: LAB | Facility: LAB | Age: 84
End: 2024-09-13
Payer: MEDICARE

## 2024-09-13 ENCOUNTER — HOSPITAL ENCOUNTER (OUTPATIENT)
Dept: RADIOLOGY | Facility: HOSPITAL | Age: 84
Discharge: HOME | End: 2024-09-13
Payer: MEDICARE

## 2024-09-13 VITALS — OXYGEN SATURATION: 99 % | WEIGHT: 153.6 LBS | HEART RATE: 83 BPM | BODY MASS INDEX: 27.22 KG/M2

## 2024-09-13 DIAGNOSIS — Z13.29 SCREENING FOR HYPOTHYROIDISM: ICD-10-CM

## 2024-09-13 DIAGNOSIS — D51.1 VIT B12 DEFIC ANEMIA D/T SLCTV VIT B12 MALABSORP W PROTEIN: ICD-10-CM

## 2024-09-13 DIAGNOSIS — E78.2 MIXED HYPERLIPIDEMIA: ICD-10-CM

## 2024-09-13 DIAGNOSIS — R22.2 LUMP IN CHEST: ICD-10-CM

## 2024-09-13 DIAGNOSIS — N18.31 STAGE 3A CHRONIC KIDNEY DISEASE (MULTI): ICD-10-CM

## 2024-09-13 DIAGNOSIS — R73.01 IMPAIRED FASTING GLUCOSE: ICD-10-CM

## 2024-09-13 DIAGNOSIS — E55.9 VITAMIN D DEFICIENCY: ICD-10-CM

## 2024-09-13 DIAGNOSIS — F43.9 STRESS: ICD-10-CM

## 2024-09-13 DIAGNOSIS — R22.2 LUMP IN CHEST: Primary | ICD-10-CM

## 2024-09-13 LAB
25(OH)D3 SERPL-MCNC: 39 NG/ML (ref 30–100)
ALBUMIN SERPL BCP-MCNC: 4.5 G/DL (ref 3.4–5)
ALP SERPL-CCNC: 46 U/L (ref 33–136)
ALT SERPL W P-5'-P-CCNC: 14 U/L (ref 7–45)
ANION GAP SERPL CALC-SCNC: 11 MMOL/L (ref 10–20)
AST SERPL W P-5'-P-CCNC: 15 U/L (ref 9–39)
BILIRUB SERPL-MCNC: 0.6 MG/DL (ref 0–1.2)
BUN SERPL-MCNC: 15 MG/DL (ref 6–23)
CALCIUM SERPL-MCNC: 9.6 MG/DL (ref 8.6–10.3)
CHLORIDE SERPL-SCNC: 104 MMOL/L (ref 98–107)
CHOLEST SERPL-MCNC: 187 MG/DL (ref 0–199)
CHOLESTEROL/HDL RATIO: 2.9
CO2 SERPL-SCNC: 28 MMOL/L (ref 21–32)
CREAT SERPL-MCNC: 0.88 MG/DL (ref 0.5–1.05)
EGFRCR SERPLBLD CKD-EPI 2021: 66 ML/MIN/1.73M*2
ERYTHROCYTE [DISTWIDTH] IN BLOOD BY AUTOMATED COUNT: 12.3 % (ref 11.5–14.5)
EST. AVERAGE GLUCOSE BLD GHB EST-MCNC: 120 MG/DL
GLUCOSE SERPL-MCNC: 82 MG/DL (ref 74–99)
HBA1C MFR BLD: 5.8 %
HCT VFR BLD AUTO: 43.4 % (ref 36–46)
HDLC SERPL-MCNC: 63.4 MG/DL
HGB BLD-MCNC: 14.4 G/DL (ref 12–16)
LDLC SERPL CALC-MCNC: 79 MG/DL
MCH RBC QN AUTO: 30.1 PG (ref 26–34)
MCHC RBC AUTO-ENTMCNC: 33.2 G/DL (ref 32–36)
MCV RBC AUTO: 91 FL (ref 80–100)
NON HDL CHOLESTEROL: 124 MG/DL (ref 0–149)
NRBC BLD-RTO: 0 /100 WBCS (ref 0–0)
PLATELET # BLD AUTO: 306 X10*3/UL (ref 150–450)
POTASSIUM SERPL-SCNC: 4.1 MMOL/L (ref 3.5–5.3)
PROT SERPL-MCNC: 7.2 G/DL (ref 6.4–8.2)
RBC # BLD AUTO: 4.78 X10*6/UL (ref 4–5.2)
SODIUM SERPL-SCNC: 139 MMOL/L (ref 136–145)
TRIGL SERPL-MCNC: 225 MG/DL (ref 0–149)
TSH SERPL-ACNC: 0.75 MIU/L (ref 0.44–3.98)
VIT B12 SERPL-MCNC: 355 PG/ML (ref 211–911)
VLDL: 45 MG/DL (ref 0–40)
WBC # BLD AUTO: 7.7 X10*3/UL (ref 4.4–11.3)

## 2024-09-13 PROCEDURE — 1157F ADVNC CARE PLAN IN RCRD: CPT

## 2024-09-13 PROCEDURE — 1160F RVW MEDS BY RX/DR IN RCRD: CPT

## 2024-09-13 PROCEDURE — 71046 X-RAY EXAM CHEST 2 VIEWS: CPT

## 2024-09-13 PROCEDURE — 1036F TOBACCO NON-USER: CPT

## 2024-09-13 PROCEDURE — 99214 OFFICE O/P EST MOD 30 MIN: CPT

## 2024-09-13 PROCEDURE — 36415 COLL VENOUS BLD VENIPUNCTURE: CPT

## 2024-09-13 PROCEDURE — 1159F MED LIST DOCD IN RCRD: CPT

## 2024-09-13 RX ORDER — HYDROXYZINE HYDROCHLORIDE 10 MG/1
10 TABLET, FILM COATED ORAL 3 TIMES DAILY
Qty: 90 TABLET | Refills: 0 | Status: SHIPPED | OUTPATIENT
Start: 2024-09-13 | End: 2024-10-13

## 2024-09-13 RX ORDER — LISINOPRIL 40 MG/1
40 TABLET ORAL DAILY
Qty: 30 TABLET | Refills: 0 | Status: SHIPPED | OUTPATIENT
Start: 2024-09-13 | End: 2024-10-13

## 2024-09-13 ASSESSMENT — ENCOUNTER SYMPTOMS
MUSCULOSKELETAL NEGATIVE: 1
ALLERGIC/IMMUNOLOGIC NEGATIVE: 1
ENDOCRINE NEGATIVE: 1
HEMATOLOGIC/LYMPHATIC NEGATIVE: 1
GASTROINTESTINAL NEGATIVE: 1
RESPIRATORY NEGATIVE: 1
EYES NEGATIVE: 1
PSYCHIATRIC NEGATIVE: 1
NEUROLOGICAL NEGATIVE: 1
CARDIOVASCULAR NEGATIVE: 1
CONSTITUTIONAL NEGATIVE: 1

## 2024-09-13 NOTE — PROGRESS NOTES
Subjective   Patient ID: Avani Abel is a 83 y.o. female who presents for Follow-up (Lump on collarbone for a while with discomfort ).    HPI an 83-year-old female arrives to clinic with chief complaint of lump on collarbone.  The patient reports having the symptoms over the last couple of weeks.  She denies any falls or traumas however it does cause some discomfort.  She also reports that she has not seen her primary care provider in over a year.  She states that she has been busy taking care of her .  She reports having elevated blood pressures that has not been controlled with lisinopril 20 mg oral tablet daily.  She has not completed any blood work as well.  She is here for further evaluation health maintenance.    Review of Systems   Constitutional: Negative.    HENT: Negative.     Eyes: Negative.    Respiratory: Negative.     Cardiovascular: Negative.    Gastrointestinal: Negative.    Endocrine: Negative.    Genitourinary: Negative.    Musculoskeletal: Negative.    Skin: Negative.         Lump on skin/chest   Allergic/Immunologic: Negative.    Neurological: Negative.    Hematological: Negative.    Psychiatric/Behavioral: Negative.     All other systems reviewed and are negative.      Objective   Pulse 83   Wt 69.7 kg (153 lb 9.6 oz)   SpO2 99%   BMI 27.22 kg/m²     Physical Exam  Chest:          Comments: Lump        Assessment/Plan   Problem List Items Addressed This Visit       Hyperlipidemia    Relevant Orders    CBC    Hemoglobin A1C    Lipid Panel    Stage 3a chronic kidney disease (Multi)    Relevant Medications    lisinopril 40 mg tablet    Other Relevant Orders    Comprehensive Metabolic Panel    Follow Up In [facility] - PCP - Established     Other Visit Diagnoses       Lump in chest    -  Primary    Relevant Orders    XR chest 2 views    Follow Up In [facility] - PCP - Established    Screening for hypothyroidism        Relevant Orders    TSH with reflex to  Free T4 if abnormal    Vitamin D deficiency        Relevant Orders    Vitamin D 25-Hydroxy,Total (for eval of Vitamin D levels)    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    Two-view chest x-ray was ordered to rule out the lump on her chest.  We will call with any abnormal results.    Lab work ordered.  Please complete lab work prior to your next appointment in 1 month with your primary care provider.  We will adjust medications accordingly.    Blood pressure in the office today was 190/100.  Patient denies any dizziness, chest pain, shortness of breath.  Lisinopril 40 mg oral tablet daily was initiated today.  Blood pressure reading log as discussed.  Bring your blood pressure reading log to your next appointment.    For any worsening signs and symptoms, please head to the emergency department.    As a result of the work-up, the patient was discharged home.  she was informed of her diagnosis and instructed to come back with any concerns or worsening of condition.  she and was agreeable to the plan as discussed above.  she was given the opportunity to ask questions.  All of the patient's questions were answered.    This document was generated using the assistance of voice recognition software. If there are any errors of spelling, grammar, syntax, or meaning; please feel free to contact me directly for clarification.

## 2024-10-14 ENCOUNTER — APPOINTMENT (OUTPATIENT)
Dept: PRIMARY CARE | Facility: CLINIC | Age: 84
End: 2024-10-14
Payer: COMMERCIAL

## 2024-10-14 ENCOUNTER — TELEPHONE (OUTPATIENT)
Dept: PRIMARY CARE | Facility: CLINIC | Age: 84
End: 2024-10-14

## 2024-10-14 VITALS
RESPIRATION RATE: 16 BRPM | HEIGHT: 63 IN | SYSTOLIC BLOOD PRESSURE: 126 MMHG | OXYGEN SATURATION: 98 % | HEART RATE: 86 BPM | BODY MASS INDEX: 27.29 KG/M2 | WEIGHT: 154 LBS | DIASTOLIC BLOOD PRESSURE: 72 MMHG

## 2024-10-14 DIAGNOSIS — M79.7 FIBROMYALGIA: ICD-10-CM

## 2024-10-14 DIAGNOSIS — I10 PRIMARY HYPERTENSION: ICD-10-CM

## 2024-10-14 DIAGNOSIS — G89.29 CHRONIC RIGHT SHOULDER PAIN: ICD-10-CM

## 2024-10-14 DIAGNOSIS — R22.2 LUMP IN CHEST: ICD-10-CM

## 2024-10-14 DIAGNOSIS — Z00.00 MEDICARE ANNUAL WELLNESS VISIT, SUBSEQUENT: ICD-10-CM

## 2024-10-14 DIAGNOSIS — M25.512 CHRONIC LEFT SHOULDER PAIN: ICD-10-CM

## 2024-10-14 DIAGNOSIS — G89.29 CHRONIC LEFT SHOULDER PAIN: ICD-10-CM

## 2024-10-14 DIAGNOSIS — F43.9 STRESS: ICD-10-CM

## 2024-10-14 DIAGNOSIS — E78.2 MIXED HYPERLIPIDEMIA: ICD-10-CM

## 2024-10-14 DIAGNOSIS — F41.9 ANXIETY: ICD-10-CM

## 2024-10-14 DIAGNOSIS — Z00.00 ROUTINE GENERAL MEDICAL EXAMINATION AT HEALTH CARE FACILITY: Primary | ICD-10-CM

## 2024-10-14 DIAGNOSIS — G25.81 RESTLESS LEG: ICD-10-CM

## 2024-10-14 DIAGNOSIS — E80.21 ACUTE INTERMITTENT (HEPATIC) PORPHYRIA (MULTI): ICD-10-CM

## 2024-10-14 DIAGNOSIS — M25.511 CHRONIC RIGHT SHOULDER PAIN: ICD-10-CM

## 2024-10-14 PROBLEM — N18.31 STAGE 3A CHRONIC KIDNEY DISEASE (MULTI): Status: RESOLVED | Noted: 2024-01-03 | Resolved: 2024-10-14

## 2024-10-14 PROCEDURE — 1157F ADVNC CARE PLAN IN RCRD: CPT | Performed by: FAMILY MEDICINE

## 2024-10-14 PROCEDURE — 90662 IIV NO PRSV INCREASED AG IM: CPT | Performed by: FAMILY MEDICINE

## 2024-10-14 PROCEDURE — 1159F MED LIST DOCD IN RCRD: CPT | Performed by: FAMILY MEDICINE

## 2024-10-14 PROCEDURE — G0439 PPPS, SUBSEQ VISIT: HCPCS | Performed by: FAMILY MEDICINE

## 2024-10-14 PROCEDURE — G0008 ADMIN INFLUENZA VIRUS VAC: HCPCS | Performed by: FAMILY MEDICINE

## 2024-10-14 PROCEDURE — 1160F RVW MEDS BY RX/DR IN RCRD: CPT | Performed by: FAMILY MEDICINE

## 2024-10-14 PROCEDURE — 1170F FXNL STATUS ASSESSED: CPT | Performed by: FAMILY MEDICINE

## 2024-10-14 PROCEDURE — 3074F SYST BP LT 130 MM HG: CPT | Performed by: FAMILY MEDICINE

## 2024-10-14 PROCEDURE — 1036F TOBACCO NON-USER: CPT | Performed by: FAMILY MEDICINE

## 2024-10-14 PROCEDURE — 3078F DIAST BP <80 MM HG: CPT | Performed by: FAMILY MEDICINE

## 2024-10-14 PROCEDURE — 99214 OFFICE O/P EST MOD 30 MIN: CPT | Performed by: FAMILY MEDICINE

## 2024-10-14 RX ORDER — DULOXETIN HYDROCHLORIDE 20 MG/1
20 CAPSULE, DELAYED RELEASE ORAL DAILY
Qty: 30 CAPSULE | Refills: 5 | Status: SHIPPED | OUTPATIENT
Start: 2024-10-14 | End: 2025-04-12

## 2024-10-14 RX ORDER — HYDROXYZINE HYDROCHLORIDE 10 MG/1
10 TABLET, FILM COATED ORAL 3 TIMES DAILY
Qty: 270 TABLET | Refills: 3 | Status: SHIPPED | OUTPATIENT
Start: 2024-10-14 | End: 2025-10-09

## 2024-10-14 RX ORDER — CYCLOBENZAPRINE HCL 5 MG
5 TABLET ORAL AS NEEDED
Qty: 90 TABLET | Refills: 3 | Status: SHIPPED | OUTPATIENT
Start: 2024-10-14

## 2024-10-14 RX ORDER — ROPINIROLE 0.5 MG/1
0.5 TABLET, FILM COATED ORAL 3 TIMES DAILY
Qty: 30 TABLET | Refills: 5 | Status: SHIPPED | OUTPATIENT
Start: 2024-10-14 | End: 2025-10-14

## 2024-10-14 RX ORDER — MULTIVITAMIN/IRON/FOLIC ACID 18MG-0.4MG
1 TABLET ORAL DAILY
COMMUNITY

## 2024-10-14 RX ORDER — METOPROLOL SUCCINATE 50 MG/1
50 TABLET, EXTENDED RELEASE ORAL DAILY
Qty: 30 TABLET | Refills: 5 | Status: SHIPPED | OUTPATIENT
Start: 2024-10-14 | End: 2025-04-12

## 2024-10-14 RX ORDER — SIMVASTATIN 20 MG/1
20 TABLET, FILM COATED ORAL DAILY
Qty: 90 TABLET | Refills: 3 | Status: SHIPPED | OUTPATIENT
Start: 2024-10-14

## 2024-10-14 RX ORDER — LISINOPRIL 40 MG/1
40 TABLET ORAL DAILY
Qty: 90 TABLET | Refills: 3 | Status: SHIPPED | OUTPATIENT
Start: 2024-10-14 | End: 2025-10-09

## 2024-10-14 RX ORDER — PREDNISONE 20 MG/1
TABLET ORAL
Qty: 18 TABLET | Refills: 0 | Status: SHIPPED | OUTPATIENT
Start: 2024-10-14

## 2024-10-14 ASSESSMENT — ACTIVITIES OF DAILY LIVING (ADL)
TAKING_MEDICATION: INDEPENDENT
MANAGING_FINANCES: INDEPENDENT
GROCERY_SHOPPING: INDEPENDENT
DRESSING: INDEPENDENT
DOING_HOUSEWORK: INDEPENDENT
BATHING: INDEPENDENT

## 2024-10-14 ASSESSMENT — ANXIETY QUESTIONNAIRES
2. NOT BEING ABLE TO STOP OR CONTROL WORRYING: NOT AT ALL
5. BEING SO RESTLESS THAT IT IS HARD TO SIT STILL: NOT AT ALL
6. BECOMING EASILY ANNOYED OR IRRITABLE: NOT AT ALL
4. TROUBLE RELAXING: NOT AT ALL
3. WORRYING TOO MUCH ABOUT DIFFERENT THINGS: NOT AT ALL
1. FEELING NERVOUS, ANXIOUS, OR ON EDGE: NOT AT ALL
7. FEELING AFRAID AS IF SOMETHING AWFUL MIGHT HAPPEN: NOT AT ALL
IF YOU CHECKED OFF ANY PROBLEMS ON THIS QUESTIONNAIRE, HOW DIFFICULT HAVE THESE PROBLEMS MADE IT FOR YOU TO DO YOUR WORK, TAKE CARE OF THINGS AT HOME, OR GET ALONG WITH OTHER PEOPLE: NOT DIFFICULT AT ALL
GAD7 TOTAL SCORE: 0

## 2024-10-14 ASSESSMENT — PATIENT HEALTH QUESTIONNAIRE - PHQ9
2. FEELING DOWN, DEPRESSED OR HOPELESS: NOT AT ALL
SUM OF ALL RESPONSES TO PHQ9 QUESTIONS 1 AND 2: 0
1. LITTLE INTEREST OR PLEASURE IN DOING THINGS: NOT AT ALL

## 2024-10-14 ASSESSMENT — ENCOUNTER SYMPTOMS
DYSPHORIC MOOD: 1
LOSS OF SENSATION IN FEET: 0
OCCASIONAL FEELINGS OF UNSTEADINESS: 0
DEPRESSION: 0

## 2024-10-14 NOTE — ASSESSMENT & PLAN NOTE
Stable and controlled.  Labs done in July  Orders:    simvastatin (Zocor) 20 mg tablet; Take 1 tablet (20 mg) by mouth once daily.

## 2024-10-14 NOTE — ASSESSMENT & PLAN NOTE
Orders:    predniSONE (Deltasone) 20 mg tablet; Take 3 tabs (60mg) daily for 3 days, then take 2 tabs (40mg) daily for 3 days, then take 1 tab (20mg) daily for 3 days.

## 2024-10-14 NOTE — ASSESSMENT & PLAN NOTE
Orders:    predniSONE (Deltasone) 20 mg tablet; Take 3 tabs (60mg) daily for 3 days, then take 2 tabs (40mg) daily for 3 days, then take 1 tab (20mg) daily for 3 days.  Will try this instead of injections.

## 2024-10-14 NOTE — PROGRESS NOTES
"Subjective   Reason for Visit: Avani Abel is an 83 y.o. female here for a Medicare Wellness visit.     Past Medical, Surgical, and Family History reviewed and updated in chart.    Reviewed all medications by prescribing practitioner or clinical pharmacist (such as prescriptions, OTCs, herbal therapies and supplements) and documented in the medical record.    DONIS Salomon is here for follow up, did see RJ last month and had clavicular prominence, CXR looked good. Saw RJ for anxiety, also having bladder leakiage  Patient Care Team:  Ninfa Lowry MD as PCP - General (Family Medicine)  Ninfa Lowry MD as PCP - [facility] Attributed Provider     Review of Systems   Psychiatric/Behavioral:  Positive for dysphoric mood.         Stressors with ill .    All other systems reviewed and are negative.      Objective   Vitals:  /72   Pulse 86   Resp 16   Ht 1.6 m (5' 3\")   Wt 69.9 kg (154 lb)   SpO2 98%   BMI 27.28 kg/m²       Physical Exam  Vitals reviewed.   Constitutional:       Appearance: Normal appearance.   HENT:      Head: Normocephalic and atraumatic.      Right Ear: Tympanic membrane normal.      Left Ear: Tympanic membrane normal.      Nose: Nose normal.      Mouth/Throat:      Mouth: Mucous membranes are moist.      Pharynx: Oropharynx is clear.   Eyes:      Extraocular Movements: Extraocular movements intact.      Conjunctiva/sclera: Conjunctivae normal.      Pupils: Pupils are equal, round, and reactive to light.   Cardiovascular:      Rate and Rhythm: Normal rate and regular rhythm.      Pulses: Normal pulses.      Heart sounds: Normal heart sounds.   Pulmonary:      Effort: Pulmonary effort is normal.      Breath sounds: Normal breath sounds.   Abdominal:      General: Abdomen is flat. Bowel sounds are normal.      Palpations: Abdomen is soft.   Musculoskeletal:         General: Normal range of motion.      Cervical back: Normal range of motion and neck supple.   Skin:     " General: Skin is warm and dry.      Capillary Refill: Capillary refill takes less than 2 seconds.   Neurological:      General: No focal deficit present.      Mental Status: She is alert and oriented to person, place, and time.   Psychiatric:         Mood and Affect: Mood normal.         Behavior: Behavior normal.         Assessment & Plan  Lump in chest  X-ray was unremarkable, has clavicular prominence.   Orders:    Follow [facility] - PCP - Established    Mixed hyperlipidemia  Stable and controlled.  Labs done in July  Orders:    simvastatin (Zocor) 20 mg tablet; Take 1 tablet (20 mg) by mouth once daily.    Stress  Is a caregiver for her .   Orders:    hydrOXYzine HCL (Atarax) 10 mg tablet; Take 1 tablet (10 mg) by mouth 3 times a day.    Routine general medical examination at health care facility    Orders:    1 Year Follow Up In [facility] - PCP - Wellness Exam; Future    1 Year Follow Up In [facility] - PCP - Wellness Exam    Primary hypertension    Orders:    lisinopril 40 mg tablet; Take 1 tablet (40 mg) by mouth once daily.    metoprolol succinate XL (Toprol-XL) 50 mg 24 hr tablet; Take 1 tablet (50 mg) by mouth once daily. Do not crush or chew.    Follow Up [facility] - PCP - Established; Future    Follow Up In [facility] - PCP - Established    Acute intermittent (hepatic) porphyria (Multi)  No NSAIDS       Medicare annual wellness visit, subsequent         Anxiety  Hydroxyzine, for anxiety   Orders:    DULoxetine (Cymbalta) 20 mg DR capsule; Take 1 capsule (20 mg) by mouth once daily. Do not crush or chew.    Chronic left shoulder pain    Orders:    predniSONE (Deltasone) 20 mg tablet; Take 3 tabs (60mg) daily for 3 days, then take 2 tabs (40mg) daily for 3 days, then take 1 tab (20mg) daily for 3 days.    Chronic right shoulder pain    Orders:    predniSONE (Deltasone) 20 mg tablet; Take 3 tabs (60mg) daily for 3 days, then take 2  tabs (40mg) daily for 3 days, then take 1 tab (20mg) daily for 3 days.  Will try this instead of injections.   Fibromyalgia    Orders:    cyclobenzaprine (Flexeril) 5 mg tablet; Take 1 tablet (5 mg) by mouth if needed for muscle spasms.    Restless leg  Will try ropinorole for night time restless leg.   Orders:    rOPINIRole (Requip) 0.5 mg tablet; Take 1 tablet (0.5 mg) by mouth 3 times a day.

## 2024-10-14 NOTE — TELEPHONE ENCOUNTER
Pharmacy called and said that armond are reporting a drug interaction with the cyclobenzaprine and the hydroxyzine, it can cause dizziness and lightheadedness  and increase patients risk of falling.

## 2024-10-14 NOTE — ASSESSMENT & PLAN NOTE
Orders:    lisinopril 40 mg tablet; Take 1 tablet (40 mg) by mouth once daily.    metoprolol succinate XL (Toprol-XL) 50 mg 24 hr tablet; Take 1 tablet (50 mg) by mouth once daily. Do not crush or chew.    Follow Up In [facility] - PCP - Established; Future    Follow Up In [facility] - PCP - Established

## 2024-10-14 NOTE — PATIENT INSTRUCTIONS
We added duloxetine for anxiety and pain in addition to hydroxyzine.   We can send you to the bladder specialist if needed. Do not want to give bladder medication with the hydroxyzine  Do physical therapy with Levi  I sent a prednisone taper which will help with arthritis in all of your joints.   You will see Rheumatolgist next month  Watch your sweets!   I sent metoprolol to the pharmacy- please let me know what blood pressure readings are at home about three times per week for a  few weeks.   Let me know if you want Beta blocker or duloxetine sent for 90 days if they are working!   I also added ropinirole for your restless legs at night.   It was good to see you.

## 2024-10-14 NOTE — ASSESSMENT & PLAN NOTE
Orders:    cyclobenzaprine (Flexeril) 5 mg tablet; Take 1 tablet (5 mg) by mouth if needed for muscle spasms.

## 2024-10-14 NOTE — ASSESSMENT & PLAN NOTE
Hydroxyzine, for anxiety   Orders:    DULoxetine (Cymbalta) 20 mg DR capsule; Take 1 capsule (20 mg) by mouth once daily. Do not crush or chew.

## 2024-10-16 ENCOUNTER — APPOINTMENT (OUTPATIENT)
Dept: PRIMARY CARE | Facility: CLINIC | Age: 84
End: 2024-10-16
Payer: COMMERCIAL

## 2024-10-31 ENCOUNTER — TELEPHONE (OUTPATIENT)
Dept: PRIMARY CARE | Facility: CLINIC | Age: 84
End: 2024-10-31
Payer: COMMERCIAL

## 2024-10-31 DIAGNOSIS — G25.81 RESTLESS LEG: ICD-10-CM

## 2024-10-31 RX ORDER — ROPINIROLE 0.5 MG/1
0.5 TABLET, FILM COATED ORAL 3 TIMES DAILY
Qty: 270 TABLET | Refills: 3 | Status: SHIPPED | OUTPATIENT
Start: 2024-10-31 | End: 2025-10-31

## 2024-11-15 ENCOUNTER — HOSPITAL ENCOUNTER (OUTPATIENT)
Dept: RADIOLOGY | Facility: CLINIC | Age: 84
Discharge: HOME | End: 2024-11-15
Payer: MEDICARE

## 2024-11-15 ENCOUNTER — APPOINTMENT (OUTPATIENT)
Dept: RHEUMATOLOGY | Facility: CLINIC | Age: 84
End: 2024-11-15
Payer: COMMERCIAL

## 2024-11-15 ENCOUNTER — LAB (OUTPATIENT)
Dept: LAB | Facility: LAB | Age: 84
End: 2024-11-15
Payer: MEDICARE

## 2024-11-15 VITALS
BODY MASS INDEX: 27.11 KG/M2 | SYSTOLIC BLOOD PRESSURE: 145 MMHG | DIASTOLIC BLOOD PRESSURE: 88 MMHG | HEART RATE: 88 BPM | WEIGHT: 153 LBS | HEIGHT: 63 IN

## 2024-11-15 DIAGNOSIS — M19.90 ARTHRITIS: ICD-10-CM

## 2024-11-15 DIAGNOSIS — M25.512 PAIN OF BOTH SHOULDER JOINTS: ICD-10-CM

## 2024-11-15 DIAGNOSIS — M25.511 PAIN OF BOTH SHOULDER JOINTS: ICD-10-CM

## 2024-11-15 DIAGNOSIS — M19.90 ARTHRITIS: Primary | ICD-10-CM

## 2024-11-15 DIAGNOSIS — I10 PRIMARY HYPERTENSION: Primary | ICD-10-CM

## 2024-11-15 LAB
CCP IGG SERPL-ACNC: <1 U/ML
CRP SERPL-MCNC: 0.16 MG/DL
ERYTHROCYTE [SEDIMENTATION RATE] IN BLOOD BY WESTERGREN METHOD: 23 MM/H (ref 0–30)
HCV AB SER QL: NONREACTIVE
RHEUMATOID FACT SER NEPH-ACNC: <10 IU/ML (ref 0–15)
URATE SERPL-MCNC: 3.8 MG/DL (ref 2.3–6.7)

## 2024-11-15 PROCEDURE — 99204 OFFICE O/P NEW MOD 45 MIN: CPT | Performed by: INTERNAL MEDICINE

## 2024-11-15 PROCEDURE — 3077F SYST BP >= 140 MM HG: CPT | Performed by: INTERNAL MEDICINE

## 2024-11-15 PROCEDURE — 86431 RHEUMATOID FACTOR QUANT: CPT

## 2024-11-15 PROCEDURE — 1125F AMNT PAIN NOTED PAIN PRSNT: CPT | Performed by: INTERNAL MEDICINE

## 2024-11-15 PROCEDURE — 3079F DIAST BP 80-89 MM HG: CPT | Performed by: INTERNAL MEDICINE

## 2024-11-15 PROCEDURE — 86803 HEPATITIS C AB TEST: CPT

## 2024-11-15 PROCEDURE — 73030 X-RAY EXAM OF SHOULDER: CPT | Mod: 50

## 2024-11-15 PROCEDURE — 1157F ADVNC CARE PLAN IN RCRD: CPT | Performed by: INTERNAL MEDICINE

## 2024-11-15 PROCEDURE — 85652 RBC SED RATE AUTOMATED: CPT

## 2024-11-15 PROCEDURE — 73130 X-RAY EXAM OF HAND: CPT | Mod: 50

## 2024-11-15 PROCEDURE — 86140 C-REACTIVE PROTEIN: CPT

## 2024-11-15 PROCEDURE — 86200 CCP ANTIBODY: CPT

## 2024-11-15 PROCEDURE — 84550 ASSAY OF BLOOD/URIC ACID: CPT

## 2024-11-15 PROCEDURE — 36415 COLL VENOUS BLD VENIPUNCTURE: CPT

## 2024-11-15 RX ORDER — HYDRALAZINE HYDROCHLORIDE 10 MG/1
10 TABLET, FILM COATED ORAL 2 TIMES DAILY
Qty: 60 TABLET | Refills: 11 | Status: SHIPPED | OUTPATIENT
Start: 2024-11-15 | End: 2025-11-15

## 2024-11-15 ASSESSMENT — PAIN SCALES - GENERAL: PAINLEVEL_OUTOF10: 4

## 2024-11-15 NOTE — LETTER
November 15, 2024     Ninfa Lowry MD  [address] [facility] [employer]" [facility] [address]  [facility] [address]    Patient: Avani Abel   YOB: 1940   Date of Visit: 11/15/2024       Dear Dr. Ninfa Lowry MD:    Thank you for referring Avani Abel to me for evaluation. Below are my notes for this consultation.  If you have questions, please do not hesitate to call me. I look forward to following your patient along with you.       Sincerely,     Joe Fregoso MD      CC: No Recipients  ______________________________________________________________________________________    Subjective. Avani Abel is a 84 y.o. female who presents for Establish Care (Np- hand pain, joint pain,knee and shoulder pain ).    HPI.  84-year-old female with history of arthritis, FMS, porphyria, RLS, anxiety and s/p left TKR (2004) referred by primary care  physician Dr.Jessica JOSE Lowry MD in consultation for joint pain evaluation.    She has chronic pain in her hands and shoulders.  Hand pain gets worse after using or doing dishes.  She notes some knuckles are croak.  Shoulders hurt upon raising the arm above the head, lifting or doing other activities.  She rates joint pain up to 10/10 at times.  Left shoulder hurts more than right.  Left shoulder pain radiates towards his neck as well as to the lateral upper arm.  She has not noticed numbness and tingling in the arms or hands.  She has no trauma to the shoulders and hands.    She notes brief shoulder pain relief after intra-articular corticosteroid injections oral steroids.  She recently started Cymbalta and notes some pain relief.    She has no history of pleuritic chest pain, shortness of breath, fever, chills, night sweats, weight loss, dry eyes, dry mouth, skin rash,photosensitivity, mucocutaneous ulcers, hair loss, iritis, Raynaud's phenomena and inflammatory bowel disease.    Social history: She is  and lives with her  ".  She does not smoke or drink.  Family history.  She believes her mother has had arthritis.  Surgical history: S/p left TKR, cholecystectomy, hysterectomy and left carpal tunnel decompression surgery.  Right knee arthroscopy.    Review of Systems   All other systems reviewed and are negative.    Objective    Blood pressure 145/88, pulse 88, height 1.6 m (5' 3\"), weight 69.4 kg (153 lb).    Physical Exam.  Gen. AAO x3, NAD.  HEENT: No pallor or icterus, PERRLA, EOMI. Oropharynx is clear. MM moist,Parotid glands  not enlarged. No cervical lymphadenopathy .  Skin: No rashes.  Heart: S1, S2/ RRR. No murmurs or gallops.  Lungs: CTA B.  Abdomen: Soft, NT/ND, BS regular.  MSK: Bilateral [place] nodes and CMC squaring.  Diffuse pain of MCP, PIP, DIP and wrist without active synovitis.  Bilateral elbow without swelling and tenderness.  Bilateral shoulders with diffuse range of motion and diffuse pain without erythema, warmth or effusion.  Neck with good range of motion.  Right knee with patellofemoral crepitation.  Bilateral ankle and MTPs without swelling and tenderness.  Lumbar spine and SI joint without tenderness.  Neuro: CN II-XII intact. Sensation to touch intact.Strength 5/5 throughout. DTR 2+ and symmetrical.  Psych:Appropriate mood and behavior  EXT: No edema    Assessment/Plan. 84-year-old female with history of arthritis, FMS, porphyria, RLS, anxiety and s/p left TKR (2004) presented with chronic pain in her hands and shoulders.    After history, physical examination and review of available labs and imaging studies, it appears she most likely has degenerative arthritis, rotator cuff disease and subacromial bursitis and fibromyalgia.    We will obtain imaging studies and labs for further evaluation.  She has noticed some pain relief with Cymbalta.  Discussed to increase the dose after consulting primary care physician.  Patient was asked to take Tylenol 1 to 2 pills twice a day.  Referred to physical " therapy.    Follow-up in 2 months.     This note was partially generated using the Dragon Voice recognition system. There may be some incorrect wording, spelling and/or spelling errors or punctuation errors that were not corrected prior to committing the note to the medical record.      Problem List Items Addressed This Visit       Arthralgia    Relevant Orders    Citrulline Antibody, IgG    C-Reactive Protein    Uric Acid    Sedimentation Rate    Rheumatoid Factor    Hepatitis C Antibody    XR shoulder 2+ views bilateral    Referral to Physical Therapy     Other Visit Diagnoses       Arthritis    -  Primary    Relevant Orders    Citrulline Antibody, IgG    C-Reactive Protein    Uric Acid    Sedimentation Rate    Rheumatoid Factor    Hepatitis C Antibody    XR shoulder 2+ views bilateral    XR hand 3+ views bilateral    Referral to Physical Therapy                 Active Ambulatory Problems     Diagnosis Date Noted   • Acute intermittent (hepatic) porphyria (Multi) 08/22/2023   • Chronic left shoulder pain 08/22/2023   • Chronic right shoulder pain 08/22/2023   • Fibromyalgia 08/22/2023   • Depression 08/22/2023   • Anxiety 08/22/2023   • Hyperlipidemia 08/31/2021   • Hyperkalemia 08/22/2023   • Arthralgia 08/22/2023   • Memory loss 08/22/2023   • Osteopenia 08/22/2023   • Medicare annual wellness visit, subsequent 08/22/2023   • Post-herpetic polyneuropathy 01/03/2024   • Shingles 01/03/2024   • Current moderate episode of major depressive disorder without prior episode (Multi) 01/03/2024   • Acute bacterial sinusitis 08/22/2024   • Acute right otitis media 08/22/2024   • Bursitis of shoulder 08/22/2024   • Cataract, right 08/22/2024   • Degeneration of intervertebral disc of cervical region 08/22/2024   • Dysfunction of eustachian tube 08/22/2024   • Eustachian tube dysfunction 08/22/2024   • Facial lesion 08/22/2024   • Lesion of face 08/22/2024   • History of knee replacement, total, left 08/22/2024   •  Hypertension 10/02/2023   • Primary hypertension 08/31/2021   • Ingrown nail of great toe of right foot 08/22/2024   • Insomnia 08/22/2024   • Low back pain syndrome 08/22/2024   • Motion sickness 08/22/2024   • Muscle spasm 08/22/2024   • Numbness of fingers of both hands 08/22/2024   • Osteoarthritis of left knee 08/22/2024   • Otitis externa 08/22/2024   • Right chronic serous otitis media 08/22/2024     Resolved Ambulatory Problems     Diagnosis Date Noted   • Stage 3a chronic kidney disease (Multi) 01/03/2024     Past Medical History:   Diagnosis Date   • Other bursitis of elbow, right elbow 09/25/2017   • Other cervical disc degeneration, unspecified cervical region    • Other conditions influencing health status    • Other intervertebral disc degeneration, lumbar region    • Pain in left hip 05/25/2016       Family History   Problem Relation Name Age of Onset   • Diabetes Mother     • Thyroid cancer Mother     • Dementia Mother     • Breast cancer Sister     • Hashimoto's thyroiditis Sister     • Thyroid cancer Sister         Past Surgical History:   Procedure Laterality Date   • APPENDECTOMY  11/15/2013    Appendectomy   • CHOLECYSTECTOMY  11/15/2013    Cholecystectomy   • TONSILLECTOMY  11/15/2013    Tonsillectomy   • TOTAL ABDOMINAL HYSTERECTOMY W/ BILATERAL SALPINGOOPHORECTOMY  11/15/2013    Total Abdominal Hysterectomy With Removal Of Both Ovaries   • TOTAL KNEE ARTHROPLASTY  11/15/2013    Knee Replacement       Social History     Tobacco Use   Smoking Status Never   • Passive exposure: Current   Smokeless Tobacco Never       Allergies  Aspirin, Barbiturates, Chloroquine, Dye, Meloxicam, Shellfish derived, Sulfa (sulfonamide antibiotics), and Adhesive tape-silicones    Current Meds  Current Outpatient Medications   Medication Instructions   • acetaminophen (TYLENOL 8 HOUR) 650 mg, Every 8 hours PRN   • b complex 0.4 mg tablet 1 tablet, Daily   • cyclobenzaprine (FLEXERIL) 5 mg, oral, As needed   •  DULoxetine (CYMBALTA) 20 mg, oral, Daily, Do not crush or chew.   • hydrOXYzine HCL (ATARAX) 10 mg, oral, 3 times daily   • lisinopril 40 mg, oral, Daily   • metoprolol succinate XL (TOPROL-XL) 50 mg, oral, Daily, Do not crush or chew.   • multivitamin tablet Take by mouth.   • predniSONE (Deltasone) 20 mg tablet Take 3 tabs (60mg) daily for 3 days, then take 2 tabs (40mg) daily for 3 days, then take 1 tab (20mg) daily for 3 days.   • rOPINIRole (REQUIP) 0.5 mg, oral, 3 times daily   • simvastatin (ZOCOR) 20 mg, oral, Daily        Lab Results   Component Value Date    RF <10 10/02/2023    SEDRATE 62 (H) 10/02/2023    CRP 1.20 (H) 10/02/2023    URICACID 5.6 10/02/2023             Joe Fregoso MD

## 2024-11-15 NOTE — PATIENT INSTRUCTIONS
Consider increase Cymbalta.  Take Tylenol as discussed.  Begin shoulder physical therapy.  Follow-up in 2 months.

## 2024-11-15 NOTE — PROGRESS NOTES
"Subjective . Avani Abel is a 84 y.o. female who presents for Establish Care (Np- hand pain, joint pain,knee and shoulder pain ).    HPI.  84-year-old female with history of arthritis, FMS, porphyria, RLS, anxiety and s/p left TKR (2004) referred by primary care  physician Dr.Jessica JOSE Lowry MD in consultation for joint pain evaluation.    She has chronic pain in her hands and shoulders.  Hand pain gets worse after using or doing dishes.  She notes some knuckles are croak.  Shoulders hurt upon raising the arm above the head, lifting or doing other activities.  She rates joint pain up to 10/10 at times.  Left shoulder hurts more than right.  Left shoulder pain radiates towards his neck as well as to the lateral upper arm.  She has not noticed numbness and tingling in the arms or hands.  She has no trauma to the shoulders and hands.    She notes brief shoulder pain relief after intra-articular corticosteroid injections oral steroids.  She recently started Cymbalta and notes some pain relief.    She has no history of pleuritic chest pain, shortness of breath, fever, chills, night sweats, weight loss, dry eyes, dry mouth, skin rash,photosensitivity, mucocutaneous ulcers, hair loss, iritis, Raynaud's phenomena and inflammatory bowel disease.    Social history: She is  and lives with her .  She does not smoke or drink.  Family history.  She believes her mother has had arthritis.  Surgical history: S/p left TKR, cholecystectomy, hysterectomy and left carpal tunnel decompression surgery.  Right knee arthroscopy.    Review of Systems   All other systems reviewed and are negative.    Objective     Blood pressure 145/88, pulse 88, height 1.6 m (5' 3\"), weight 69.4 kg (153 lb).    Physical Exam.  Gen. AAO x3, NAD.  HEENT: No pallor or icterus, PERRLA, EOMI. Oropharynx is clear. MM moist,Parotid glands  not enlarged. No cervical lymphadenopathy .  Skin: No rashes.  Heart: S1, S2/ RRR. No murmurs or " gallops.  Lungs: CTA B.  Abdomen: Soft, NT/ND, BS regular.  MSK: Bilateral [place]ber[place] nodes and CMC squaring.  Diffuse pain of MCP, PIP, DIP and wrist without active synovitis.  Bilateral elbow without swelling and tenderness.  Bilateral shoulders with diffuse range of motion and diffuse pain without erythema, warmth or effusion.  Neck with good range of motion.  Right knee with patellofemoral crepitation.  Bilateral ankle and MTPs without swelling and tenderness.  Lumbar spine and SI joint without tenderness.  Neuro: CN II-XII intact. Sensation to touch intact.Strength 5/5 throughout. DTR 2+ and symmetrical.  Psych:Appropriate mood and behavior  EXT: No edema    Assessment/Plan . 84-year-old female with history of arthritis, FMS, porphyria, RLS, anxiety and s/p left TKR (2004) presented with chronic pain in her hands and shoulders.    After history, physical examination and review of available labs and imaging studies, it appears she most likely has degenerative arthritis, rotator cuff disease and subacromial bursitis and fibromyalgia.    We will obtain imaging studies and labs for further evaluation.  She has noticed some pain relief with Cymbalta.  Discussed to increase the dose after consulting primary care physician.  Patient was asked to take Tylenol 1 to 2 pills twice a day.  Referred to physical therapy.    Follow-up in 2 months.     This note was partially generated using the Dragon Voice recognition system. There may be some incorrect wording, spelling and/or spelling errors or punctuation errors that were not corrected prior to committing the note to the medical record.      Problem List Items Addressed This Visit       Arthralgia    Relevant Orders    Citrulline Antibody, IgG    C-Reactive Protein    Uric Acid    Sedimentation Rate    Rheumatoid Factor    Hepatitis C Antibody    XR shoulder 2+ views bilateral    Referral to Physical Therapy     Other Visit Diagnoses       Arthritis    -  Primary     Relevant Orders    Citrulline Antibody, IgG    C-Reactive Protein    Uric Acid    Sedimentation Rate    Rheumatoid Factor    Hepatitis C Antibody    XR shoulder 2+ views bilateral    XR hand 3+ views bilateral    Referral to Physical Therapy                 Active Ambulatory Problems     Diagnosis Date Noted    Acute intermittent (hepatic) porphyria (Multi) 08/22/2023    Chronic left shoulder pain 08/22/2023    Chronic right shoulder pain 08/22/2023    Fibromyalgia 08/22/2023    Depression 08/22/2023    Anxiety 08/22/2023    Hyperlipidemia 08/31/2021    Hyperkalemia 08/22/2023    Arthralgia 08/22/2023    Memory loss 08/22/2023    Osteopenia 08/22/2023    Medicare annual wellness visit, subsequent 08/22/2023    Post-herpetic polyneuropathy 01/03/2024    Shingles 01/03/2024    Current moderate episode of major depressive disorder without prior episode (Multi) 01/03/2024    Acute bacterial sinusitis 08/22/2024    Acute right otitis media 08/22/2024    Bursitis of shoulder 08/22/2024    Cataract, right 08/22/2024    Degeneration of intervertebral disc of cervical region 08/22/2024    Dysfunction of eustachian tube 08/22/2024    Eustachian tube dysfunction 08/22/2024    Facial lesion 08/22/2024    Lesion of face 08/22/2024    History of knee replacement, total, left 08/22/2024    Hypertension 10/02/2023    Primary hypertension 08/31/2021    Ingrown nail of great toe of right foot 08/22/2024    Insomnia 08/22/2024    Low back pain syndrome 08/22/2024    Motion sickness 08/22/2024    Muscle spasm 08/22/2024    Numbness of fingers of both hands 08/22/2024    Osteoarthritis of left knee 08/22/2024    Otitis externa 08/22/2024    Right chronic serous otitis media 08/22/2024     Resolved Ambulatory Problems     Diagnosis Date Noted    Stage 3a chronic kidney disease (Multi) 01/03/2024     Past Medical History:   Diagnosis Date    Other bursitis of elbow, right elbow 09/25/2017    Other cervical disc degeneration, unspecified  cervical region     Other conditions influencing health status     Other intervertebral disc degeneration, lumbar region     Pain in left hip 05/25/2016       Family History   Problem Relation Name Age of Onset    Diabetes Mother      Thyroid cancer Mother      Dementia Mother      Breast cancer Sister      Hashimoto's thyroiditis Sister      Thyroid cancer Sister         Past Surgical History:   Procedure Laterality Date    APPENDECTOMY  11/15/2013    Appendectomy    CHOLECYSTECTOMY  11/15/2013    Cholecystectomy    TONSILLECTOMY  11/15/2013    Tonsillectomy    TOTAL ABDOMINAL HYSTERECTOMY W/ BILATERAL SALPINGOOPHORECTOMY  11/15/2013    Total Abdominal Hysterectomy With Removal Of Both Ovaries    TOTAL KNEE ARTHROPLASTY  11/15/2013    Knee Replacement       Social History     Tobacco Use   Smoking Status Never    Passive exposure: Current   Smokeless Tobacco Never       Allergies  Aspirin, Barbiturates, Chloroquine, Dye, Meloxicam, Shellfish derived, Sulfa (sulfonamide antibiotics), and Adhesive tape-silicones    Current Meds  Current Outpatient Medications   Medication Instructions    acetaminophen (TYLENOL 8 HOUR) 650 mg, Every 8 hours PRN    b complex 0.4 mg tablet 1 tablet, Daily    cyclobenzaprine (FLEXERIL) 5 mg, oral, As needed    DULoxetine (CYMBALTA) 20 mg, oral, Daily, Do not crush or chew.    hydrOXYzine HCL (ATARAX) 10 mg, oral, 3 times daily    lisinopril 40 mg, oral, Daily    metoprolol succinate XL (TOPROL-XL) 50 mg, oral, Daily, Do not crush or chew.    multivitamin tablet Take by mouth.    predniSONE (Deltasone) 20 mg tablet Take 3 tabs (60mg) daily for 3 days, then take 2 tabs (40mg) daily for 3 days, then take 1 tab (20mg) daily for 3 days.    rOPINIRole (REQUIP) 0.5 mg, oral, 3 times daily    simvastatin (ZOCOR) 20 mg, oral, Daily        Lab Results   Component Value Date    RF <10 10/02/2023    SEDRATE 62 (H) 10/02/2023    CRP 1.20 (H) 10/02/2023    URICACID 5.6 10/02/2023             Joe  MD [place]

## 2024-12-02 ENCOUNTER — APPOINTMENT (OUTPATIENT)
Dept: PRIMARY CARE | Facility: CLINIC | Age: 84
End: 2024-12-02
Payer: COMMERCIAL

## 2024-12-10 DIAGNOSIS — I10 PRIMARY HYPERTENSION: Primary | ICD-10-CM

## 2024-12-10 RX ORDER — HYDRALAZINE HYDROCHLORIDE 25 MG/1
25 TABLET, FILM COATED ORAL 2 TIMES DAILY
Qty: 60 TABLET | Refills: 11 | Status: SHIPPED | OUTPATIENT
Start: 2024-12-10 | End: 2025-12-10

## 2024-12-29 DIAGNOSIS — G89.29 CHRONIC RIGHT SHOULDER PAIN: ICD-10-CM

## 2024-12-29 DIAGNOSIS — M25.512 CHRONIC LEFT SHOULDER PAIN: Primary | ICD-10-CM

## 2024-12-29 DIAGNOSIS — M25.511 CHRONIC RIGHT SHOULDER PAIN: ICD-10-CM

## 2024-12-29 DIAGNOSIS — G89.29 CHRONIC LEFT SHOULDER PAIN: Primary | ICD-10-CM

## 2024-12-29 DIAGNOSIS — F41.9 ANXIETY: ICD-10-CM

## 2024-12-29 RX ORDER — DULOXETIN HYDROCHLORIDE 30 MG/1
30 CAPSULE, DELAYED RELEASE ORAL 2 TIMES DAILY
Qty: 60 CAPSULE | Refills: 5 | Status: SHIPPED | OUTPATIENT
Start: 2024-12-29 | End: 2025-06-27

## 2025-01-15 ENCOUNTER — APPOINTMENT (OUTPATIENT)
Dept: RHEUMATOLOGY | Facility: CLINIC | Age: 85
End: 2025-01-15
Payer: COMMERCIAL

## 2025-04-03 ENCOUNTER — APPOINTMENT (OUTPATIENT)
Dept: PRIMARY CARE | Facility: CLINIC | Age: 85
End: 2025-04-03
Payer: COMMERCIAL

## 2025-04-03 VITALS
WEIGHT: 151 LBS | HEIGHT: 63 IN | SYSTOLIC BLOOD PRESSURE: 160 MMHG | BODY MASS INDEX: 26.75 KG/M2 | DIASTOLIC BLOOD PRESSURE: 88 MMHG | HEART RATE: 95 BPM | RESPIRATION RATE: 16 BRPM | OXYGEN SATURATION: 94 %

## 2025-04-03 DIAGNOSIS — F32.1 CURRENT MODERATE EPISODE OF MAJOR DEPRESSIVE DISORDER WITHOUT PRIOR EPISODE (MULTI): ICD-10-CM

## 2025-04-03 DIAGNOSIS — M79.7 FIBROMYALGIA: ICD-10-CM

## 2025-04-03 DIAGNOSIS — F41.9 ANXIETY: ICD-10-CM

## 2025-04-03 DIAGNOSIS — R73.02 IMPAIRED GLUCOSE TOLERANCE: ICD-10-CM

## 2025-04-03 DIAGNOSIS — G89.29 CHRONIC RIGHT SHOULDER PAIN: ICD-10-CM

## 2025-04-03 DIAGNOSIS — Z00.00 ROUTINE GENERAL MEDICAL EXAMINATION AT HEALTH CARE FACILITY: Primary | ICD-10-CM

## 2025-04-03 DIAGNOSIS — M25.511 CHRONIC RIGHT SHOULDER PAIN: ICD-10-CM

## 2025-04-03 DIAGNOSIS — E80.21 ACUTE INTERMITTENT (HEPATIC) PORPHYRIA (MULTI): ICD-10-CM

## 2025-04-03 DIAGNOSIS — G89.29 CHRONIC LEFT SHOULDER PAIN: ICD-10-CM

## 2025-04-03 DIAGNOSIS — I10 PRIMARY HYPERTENSION: ICD-10-CM

## 2025-04-03 DIAGNOSIS — M25.512 CHRONIC LEFT SHOULDER PAIN: ICD-10-CM

## 2025-04-03 PROCEDURE — 99214 OFFICE O/P EST MOD 30 MIN: CPT | Performed by: FAMILY MEDICINE

## 2025-04-03 PROCEDURE — 1123F ACP DISCUSS/DSCN MKR DOCD: CPT | Performed by: FAMILY MEDICINE

## 2025-04-03 PROCEDURE — 1157F ADVNC CARE PLAN IN RCRD: CPT | Performed by: FAMILY MEDICINE

## 2025-04-03 PROCEDURE — 3074F SYST BP LT 130 MM HG: CPT | Performed by: FAMILY MEDICINE

## 2025-04-03 PROCEDURE — 1036F TOBACCO NON-USER: CPT | Performed by: FAMILY MEDICINE

## 2025-04-03 PROCEDURE — 1159F MED LIST DOCD IN RCRD: CPT | Performed by: FAMILY MEDICINE

## 2025-04-03 PROCEDURE — 3078F DIAST BP <80 MM HG: CPT | Performed by: FAMILY MEDICINE

## 2025-04-03 RX ORDER — HYDRALAZINE HYDROCHLORIDE 50 MG/1
50 TABLET, FILM COATED ORAL 2 TIMES DAILY
Qty: 180 TABLET | Refills: 1 | Status: SHIPPED | OUTPATIENT
Start: 2025-04-03 | End: 2026-04-03

## 2025-04-03 RX ORDER — PREDNISONE 20 MG/1
TABLET ORAL
Qty: 18 TABLET | Refills: 0 | Status: SHIPPED | OUTPATIENT
Start: 2025-04-03

## 2025-04-03 RX ORDER — GABAPENTIN 100 MG/1
100 CAPSULE ORAL 2 TIMES DAILY
Qty: 180 CAPSULE | Refills: 1 | Status: SHIPPED | OUTPATIENT
Start: 2025-04-03 | End: 2025-09-30

## 2025-04-03 RX ORDER — LISINOPRIL 20 MG/1
40 TABLET ORAL DAILY
Qty: 180 TABLET | Refills: 3 | Status: SHIPPED | OUTPATIENT
Start: 2025-04-03 | End: 2026-04-03

## 2025-04-03 RX ORDER — HYDROXYZINE PAMOATE 25 MG/1
25 CAPSULE ORAL NIGHTLY
Qty: 90 CAPSULE | Refills: 3 | Status: SHIPPED | OUTPATIENT
Start: 2025-04-03 | End: 2026-04-03

## 2025-04-03 RX ORDER — CYCLOBENZAPRINE HCL 5 MG
5 TABLET ORAL NIGHTLY PRN
Qty: 90 TABLET | Refills: 3 | Status: SHIPPED | OUTPATIENT
Start: 2025-04-03

## 2025-04-03 ASSESSMENT — COLUMBIA-SUICIDE SEVERITY RATING SCALE - C-SSRS
2. HAVE YOU ACTUALLY HAD ANY THOUGHTS OF KILLING YOURSELF?: NO
6. HAVE YOU EVER DONE ANYTHING, STARTED TO DO ANYTHING, OR PREPARED TO DO ANYTHING TO END YOUR LIFE?: NO
1. IN THE PAST MONTH, HAVE YOU WISHED YOU WERE DEAD OR WISHED YOU COULD GO TO SLEEP AND NOT WAKE UP?: NO

## 2025-04-03 ASSESSMENT — ENCOUNTER SYMPTOMS
NERVOUS/ANXIOUS: 1
NECK PAIN: 1
OCCASIONAL FEELINGS OF UNSTEADINESS: 0
ARTHRALGIAS: 1
LOSS OF SENSATION IN FEET: 0
DEPRESSION: 0

## 2025-04-03 ASSESSMENT — PATIENT HEALTH QUESTIONNAIRE - PHQ9
1. LITTLE INTEREST OR PLEASURE IN DOING THINGS: NOT AT ALL
2. FEELING DOWN, DEPRESSED OR HOPELESS: NOT AT ALL
SUM OF ALL RESPONSES TO PHQ9 QUESTIONS 1 AND 2: 0

## 2025-04-03 ASSESSMENT — ANXIETY QUESTIONNAIRES
4. TROUBLE RELAXING: NOT AT ALL
6. BECOMING EASILY ANNOYED OR IRRITABLE: NOT AT ALL
3. WORRYING TOO MUCH ABOUT DIFFERENT THINGS: NOT AT ALL
5. BEING SO RESTLESS THAT IT IS HARD TO SIT STILL: NOT AT ALL
7. FEELING AFRAID AS IF SOMETHING AWFUL MIGHT HAPPEN: NOT AT ALL
2. NOT BEING ABLE TO STOP OR CONTROL WORRYING: NOT AT ALL
GAD7 TOTAL SCORE: 0
1. FEELING NERVOUS, ANXIOUS, OR ON EDGE: NOT AT ALL
IF YOU CHECKED OFF ANY PROBLEMS ON THIS QUESTIONNAIRE, HOW DIFFICULT HAVE THESE PROBLEMS MADE IT FOR YOU TO DO YOUR WORK, TAKE CARE OF THINGS AT HOME, OR GET ALONG WITH OTHER PEOPLE: NOT DIFFICULT AT ALL

## 2025-04-03 NOTE — ASSESSMENT & PLAN NOTE
Orders:    Referral to Orthopedics and Sports Medicine; Future    gabapentin (Neurontin) 100 mg capsule; Take 1 capsule (100 mg) by mouth 2 times a day.    predniSONE (Deltasone) 20 mg tablet; Take 3 tabs (60mg) daily for 3 days, then take 2 tabs (40mg) daily for 3 days, then take 1 tab (20mg) daily for 3 days.    Follow Up In [facility] - PCP - Established; Future

## 2025-04-03 NOTE — ASSESSMENT & PLAN NOTE
Orders:    hydrALAZINE (Apresoline) 50 mg tablet; Take 1 tablet (50 mg) by mouth 2 times a day.    lisinopril 20 mg tablet; Take 2 tablets (40 mg) by mouth once daily.    Hemoglobin A1C; Future    CBC and Auto Differential; Future    Comprehensive Metabolic Panel; Future    Lipid Panel; Future    TSH with reflex to Free T4 if abnormal; Future    Follow Up In [facility] - PCP - Established; Future

## 2025-04-03 NOTE — ASSESSMENT & PLAN NOTE
Orders:    cyclobenzaprine (Flexeril) 5 mg tablet; Take 1 tablet (5 mg) by mouth as needed at bedtime for muscle spasms.    Hemoglobin A1C; Future    CBC and Auto Differential; Future    Comprehensive Metabolic Panel; Future    Lipid Panel; Future

## 2025-04-03 NOTE — PATIENT INSTRUCTIONS
Recheck six month with bloodwork before   For pain, lets try and see if gabapentin 100 mg twice daily with duloxetine helps. Would also send to shoulder and hand doctor and see if prednisone taper helps your pain.   Increased hydralazine to 50 mg twice daily for blood pressure, changed lisinopril to 2 20 mg once daily.   Refilled cyclobenzaprine for bedtime if muscles hurt.   Increased hydroxyzine to 25 mg at bedtime,           Ways to Help Prevent Falls at Home    Quick Tips   ? Ask for help if you need it. Most people want to help!   ? Get up slowly after sitting or laying down   ? Wear a medical alert device or keep cell phone in your pocket   ? Use night lights, especially areas near a bathroom   ? Keep the items you use often within reach on a small stool or end table   ? Use an assistive device such as walker or cane, as directed by provider/physical therapy   ? Use a non-slip mat and grab bars in your bathroom. Look for home health sections for best options     Other Areas to Focus On   ? Exercise and nutrition: Regular exercise or taking a falls prevention class are great ways improve strength and balance. Don’t forget to stay hydrated and bring a snack!   ? Medicine side effects: Some medicines can make you sleepy or dizzy, which could cause a fall. Ask your healthcare provider about the side effects your medicines could cause. Be sure to let them know if you take any vitamins or supplements as well.   ? Tripping hazards: Remove items you could trip on, such as loose mats, rugs, cords, and clutter. Wear closed toe shoes with rubber soles.   ? Health and wellness: Get regular checkups with your healthcare provider, plus routine vision and hearing screenings. Talk with your healthcare provider about:   o Your medicines and the possible side effects - bring them in a bag if that is easier!   o Problems with balance or feeling dizzy   o Ways to promote bone health, such as Vitamin D and calcium supplements   o  Questions or concerns about falling     *Ask your healthcare team if you have questions     ©[facility], 2022

## 2025-04-03 NOTE — PROGRESS NOTES
"Subjective   Reason for Visit: Avani Abel is an 84 y.o. female here for a Medicare Wellness visit.     Past Medical, Surgical, and Family History reviewed and updated in chart.    Reviewed all medications by prescribing practitioner or clinical pharmacist (such as prescriptions, OTCs, herbal therapies and supplements) and documented in the medical record.    DONIS Salomon is here for follow up, and she is nervous, Her blood pressure has been running up, right ear feels like it is dripping, right pointer finger hurts all the time, neck and shoulder are hurting her, She is having trouble sleeping,  sleeps in same bed, worries about him. Had shoulder injections last year with some relief. Rheumatologist has left.   Patient Care Team:  Ninfa Lowry MD as PCP - General (Family Medicine)  [facility]     Review of Systems   HENT:  Positive for hearing loss.         Ear feels wet   Musculoskeletal:  Positive for arthralgias and neck pain.   Psychiatric/Behavioral:  The patient is nervous/anxious.    All other systems reviewed and are negative.  Weight has been the same. Not wearing hearing aids today.     Objective   Vitals:  /74   Pulse 95   Resp 16   Ht 1.6 m (5' 3\")   Wt 68.5 kg (151 lb)   SpO2 94%   BMI 26.75 kg/m²       Physical Exam  Vitals reviewed.   Constitutional:       Appearance: Normal appearance.   HENT:      Head: Normocephalic and atraumatic.      Right Ear: Tympanic membrane normal.      Left Ear: Tympanic membrane normal.      Nose: Nose normal.      Mouth/Throat:      Mouth: Mucous membranes are moist.      Pharynx: Oropharynx is clear.   Eyes:      Extraocular Movements: Extraocular movements intact.      Conjunctiva/sclera: Conjunctivae normal.      Pupils: Pupils are equal, round, and reactive to light.   Cardiovascular:      Rate and Rhythm: Normal rate and regular rhythm.      Pulses: Normal pulses.      Heart sounds: " Normal heart sounds.   Pulmonary:      Effort: Pulmonary effort is normal.      Breath sounds: Normal breath sounds.   Abdominal:      General: Abdomen is flat. Bowel sounds are normal.      Palpations: Abdomen is soft.   Musculoskeletal:         General: Swelling, tenderness and deformity present.      Cervical back: Normal range of motion and neck supple.      Comments: Bilateral shoulder crepitus, neck pain and right index finger deformity   Skin:     General: Skin is warm and dry.      Capillary Refill: Capillary refill takes less than 2 seconds.   Neurological:      General: No focal deficit present.      Mental Status: She is alert and oriented to person, place, and time.   Psychiatric:         Mood and Affect: Mood normal.         Behavior: Behavior normal.         Assessment & Plan  Routine general medical examination at health care facility    Orders:    1 Year Follow Up In [facility] - PCP - Wellness Exam; Future    Fibromyalgia    Orders:    cyclobenzaprine (Flexeril) 5 mg tablet; Take 1 tablet (5 mg) by mouth as needed at bedtime for muscle spasms.    Hemoglobin A1C; Future    CBC and Auto Differential; Future    Comprehensive Metabolic Panel; Future    Lipid Panel; Future    Acute intermittent (hepatic) porphyria (Multi)  Stable and controlled         Current moderate episode of major depressive disorder without prior episode (Multi)  Duloxetine 30 bid       Primary hypertension    Orders:    hydrALAZINE (Apresoline) 50 mg tablet; Take 1 tablet (50 mg) by mouth 2 times a day.    lisinopril 20 mg tablet; Take 2 tablets (40 mg) by mouth once daily.    Hemoglobin A1C; Future    CBC and Auto Differential; Future    Comprehensive Metabolic Panel; Future    Lipid Panel; Future    TSH with reflex to Free T4 if abnormal; Future    Follow Up In [facility] - PCP - Established; Future    Chronic left shoulder pain    Orders:    Referral to Orthopedics and Sports Medicine; Future    gabapentin  (Neurontin) 100 mg capsule; Take 1 capsule (100 mg) by mouth 2 times a day.    predniSONE (Deltasone) 20 mg tablet; Take 3 tabs (60mg) daily for 3 days, then take 2 tabs (40mg) daily for 3 days, then take 1 tab (20mg) daily for 3 days.    Follow Up In [facility] - PCP - Established; Future    Chronic right shoulder pain    Orders:    Referral to Orthopedics and Sports Medicine; Future    gabapentin (Neurontin) 100 mg capsule; Take 1 capsule (100 mg) by mouth 2 times a day.    predniSONE (Deltasone) 20 mg tablet; Take 3 tabs (60mg) daily for 3 days, then take 2 tabs (40mg) daily for 3 days, then take 1 tab (20mg) daily for 3 days.    Follow Up In [facility] - PCP - Established; Future    Anxiety  duloxetine  Orders:    hydrOXYzine pamoate (VistariL) 25 mg capsule; Take 1 capsule (25 mg) by mouth once daily at bedtime.    Impaired glucose tolerance  Rechick,, no diagnosis of diabetes  Orders:    Hemoglobin A1C; Future          Patient was identified as a fall risk. Risk prevention instructions provided.

## 2025-04-03 NOTE — ASSESSMENT & PLAN NOTE
duloxetine  Orders:    hydrOXYzine pamoate (VistariL) 25 mg capsule; Take 1 capsule (25 mg) by mouth once daily at bedtime.

## 2025-04-07 DIAGNOSIS — I10 PRIMARY HYPERTENSION: Primary | ICD-10-CM

## 2025-04-07 DIAGNOSIS — F51.01 PRIMARY INSOMNIA: ICD-10-CM

## 2025-04-07 RX ORDER — HYDROXYZINE HYDROCHLORIDE 25 MG/1
25 TABLET, FILM COATED ORAL NIGHTLY PRN
Qty: 90 TABLET | Refills: 1 | Status: SHIPPED | OUTPATIENT
Start: 2025-04-07

## 2025-04-07 RX ORDER — METOPROLOL SUCCINATE 50 MG/1
50 TABLET, EXTENDED RELEASE ORAL DAILY
Qty: 90 TABLET | Refills: 3 | Status: SHIPPED | OUTPATIENT
Start: 2025-04-07 | End: 2026-04-07

## 2025-04-11 ENCOUNTER — APPOINTMENT (OUTPATIENT)
Dept: ORTHOPEDIC SURGERY | Facility: CLINIC | Age: 85
End: 2025-04-11
Payer: MEDICARE

## 2025-04-11 DIAGNOSIS — M25.511 CHRONIC PAIN OF BOTH SHOULDERS: ICD-10-CM

## 2025-04-11 DIAGNOSIS — M19.011 ARTHRITIS OF BOTH SHOULDERS: Primary | ICD-10-CM

## 2025-04-11 DIAGNOSIS — M19.012 ARTHRITIS OF BOTH SHOULDERS: Primary | ICD-10-CM

## 2025-04-11 DIAGNOSIS — M25.512 CHRONIC PAIN OF BOTH SHOULDERS: ICD-10-CM

## 2025-04-11 DIAGNOSIS — G89.29 CHRONIC PAIN OF BOTH SHOULDERS: ICD-10-CM

## 2025-04-11 PROCEDURE — 20610 DRAIN/INJ JOINT/BURSA W/O US: CPT | Performed by: STUDENT IN AN ORGANIZED HEALTH CARE EDUCATION/TRAINING PROGRAM

## 2025-04-11 PROCEDURE — 1159F MED LIST DOCD IN RCRD: CPT | Performed by: STUDENT IN AN ORGANIZED HEALTH CARE EDUCATION/TRAINING PROGRAM

## 2025-04-11 PROCEDURE — 99204 OFFICE O/P NEW MOD 45 MIN: CPT | Performed by: STUDENT IN AN ORGANIZED HEALTH CARE EDUCATION/TRAINING PROGRAM

## 2025-04-11 PROCEDURE — 1123F ACP DISCUSS/DSCN MKR DOCD: CPT | Performed by: STUDENT IN AN ORGANIZED HEALTH CARE EDUCATION/TRAINING PROGRAM

## 2025-04-11 PROCEDURE — 1157F ADVNC CARE PLAN IN RCRD: CPT | Performed by: STUDENT IN AN ORGANIZED HEALTH CARE EDUCATION/TRAINING PROGRAM

## 2025-04-11 RX ORDER — TRIAMCINOLONE ACETONIDE 40 MG/ML
80 INJECTION, SUSPENSION INTRA-ARTICULAR; INTRAMUSCULAR
Status: COMPLETED | OUTPATIENT
Start: 2025-04-11 | End: 2025-04-11

## 2025-04-11 RX ORDER — LIDOCAINE HYDROCHLORIDE 10 MG/ML
5 INJECTION, SOLUTION INFILTRATION; PERINEURAL
Status: COMPLETED | OUTPATIENT
Start: 2025-04-11 | End: 2025-04-11

## 2025-04-11 RX ADMIN — LIDOCAINE HYDROCHLORIDE 5 ML: 10 INJECTION, SOLUTION INFILTRATION; PERINEURAL at 12:25

## 2025-04-11 RX ADMIN — TRIAMCINOLONE ACETONIDE 80 MG: 40 INJECTION, SUSPENSION INTRA-ARTICULAR; INTRAMUSCULAR at 12:25

## 2025-04-11 NOTE — PROGRESS NOTES
CHIEF COMPLAINT: No chief complaint on file.      History: 84 y.o. female presents to the office today for Bilateral shoulder pain with increased pain in the left. Pain has been ongoing, and radiates into her neck and down her arm. Does have numbness and tingling in her shoulders. Tylenol provides no improvement but patient does take prescription medication with little improvement.  Has difficulty grabbing items. XR done 11/15/2024    Past medical history, past surgical history, medications, allergies, family history, social history, and review of systems were reviewed today.    A 12 point review of systems was negative other than as stated in the HPI.    Past Medical History:   Diagnosis Date    Other bursitis of elbow, right elbow 09/25/2017    Radiohumeral bursitis of both elbows    Other cervical disc degeneration, unspecified cervical region     Degeneration of cervical intervertebral disc    Other conditions influencing health status     Mammogram    Other intervertebral disc degeneration, lumbar region     Disc degeneration, lumbar    Pain in left hip 05/25/2016    Left hip pain    Stage 3a chronic kidney disease (Multi) 01/03/2024        Allergies   Allergen Reactions    Aspirin Other    Barbiturates Headache    Chloroquine Unknown    Dye Unknown     IVP DYE    Meloxicam Headache    Shellfish Derived Hives    Sulfa (Sulfonamide Antibiotics) Other    Adhesive Tape-Silicones Rash        Past Surgical History:   Procedure Laterality Date    APPENDECTOMY  11/15/2013    Appendectomy    CHOLECYSTECTOMY  11/15/2013    Cholecystectomy    TONSILLECTOMY  11/15/2013    Tonsillectomy    TOTAL ABDOMINAL HYSTERECTOMY W/ BILATERAL SALPINGOOPHORECTOMY  11/15/2013    Total Abdominal Hysterectomy With Removal Of Both Ovaries    TOTAL KNEE ARTHROPLASTY  11/15/2013    Knee Replacement        Family History   Problem Relation Name Age of Onset    Diabetes Mother      Thyroid cancer Mother      Dementia Mother      Breast cancer  Sister      Hashimoto's thyroiditis Sister      Thyroid cancer Sister          Social History     Socioeconomic History    Marital status:      Spouse name: Not on file    Number of children: Not on file    Years of education: Not on file    Highest education level: Not on file   Occupational History    Not on file   Tobacco Use    Smoking status: Never     Passive exposure: Current    Smokeless tobacco: Never   Vaping Use    Vaping status: Never Used   Substance and Sexual Activity    Alcohol use: Not Currently    Drug use: Never    Sexual activity: Not on file   Other Topics Concern    Not on file   Social History Narrative    Not on file     Social Drivers of Health     Financial Resource Strain: Not on file   Food Insecurity: Not on file   Transportation Needs: Not on file   Physical Activity: Not on file   Stress: Not on file   Social Connections: Not on file   Intimate Partner Violence: Not on file   Housing Stability: Not on file        CURRENT MEDICATIONS:   Current Outpatient Medications   Medication Sig Dispense Refill    acetaminophen (Tylenol 8 HOUR) 650 mg ER tablet Take 1 tablet (650 mg) by mouth every 8 hours if needed for mild pain (1 - 3). Do not crush, chew, or split.      b complex 0.4 mg tablet Take 1 tablet by mouth once daily.      cyclobenzaprine (Flexeril) 5 mg tablet Take 1 tablet (5 mg) by mouth as needed at bedtime for muscle spasms. 90 tablet 3    DULoxetine (Cymbalta) 30 mg DR capsule Take 1 capsule (30 mg) by mouth 2 times a day. Do not crush or chew. 60 capsule 5    gabapentin (Neurontin) 100 mg capsule Take 1 capsule (100 mg) by mouth 2 times a day. 180 capsule 1    hydrALAZINE (Apresoline) 50 mg tablet Take 1 tablet (50 mg) by mouth 2 times a day. 180 tablet 1    hydrOXYzine HCL (Atarax) 25 mg tablet Take 1 tablet (25 mg) by mouth as needed at bedtime for itching. 90 tablet 1    lisinopril 20 mg tablet Take 2 tablets (40 mg) by mouth once daily. 180 tablet 3    metoprolol  "succinate XL (Toprol-XL) 50 mg 24 hr tablet Take 1 tablet (50 mg) by mouth once daily. Do not crush or chew. 90 tablet 3    multivitamin tablet Take by mouth.      predniSONE (Deltasone) 20 mg tablet Take 3 tabs (60mg) daily for 3 days, then take 2 tabs (40mg) daily for 3 days, then take 1 tab (20mg) daily for 3 days. 18 tablet 0    rOPINIRole (Requip) 0.5 mg tablet Take 1 tablet (0.5 mg) by mouth 3 times a day. 270 tablet 3    simvastatin (Zocor) 20 mg tablet Take 1 tablet (20 mg) by mouth once daily. 90 tablet 3     No current facility-administered medications for this visit.       Physical Examination:      6/6/2024     3:12 PM 9/13/2024    12:54 PM 10/14/2024     9:25 AM 11/15/2024    10:24 AM 4/3/2025    10:26 AM 4/3/2025    11:14 AM 4/3/2025    11:15 AM   Vitals   Systolic 142  126 145 118 160 160   Diastolic 80  72 88 74 80 88   BP Location      Right arm Left arm   Heart Rate 92 83 86 88 95     Resp   16  16     Height 1.6 m (5' 2.99\")  1.6 m (5' 3\") 1.6 m (5' 3\") 1.6 m (5' 3\")     Weight (lb) 157 153.6 154 153 151     BMI 27.82 kg/m2 27.22 kg/m2 27.28 kg/m2 27.1 kg/m2 26.75 kg/m2     BSA (m2) 1.78 m2 1.76 m2 1.76 m2 1.76 m2 1.74 m2     Visit Report Report Report Report Report Report Report Report      There is no height or weight on file to calculate BMI.    Well-appearing, appears stated age, pleasant and cooperative, appropriate mood and behavior. Height and weight reviewed. Alert and oriented x3.  Auditory function intact.  No acute distress.  Intact ocular function, JOHN, EOMI. Breathing is unlabored .  There is no evidence of jugular venous distension. Skin appearance is normal without evidence of rash or other lesions. 2+ radial pulses bilaterally, fingers pink and wwp, good capillary refill, no pitting edema. No appreciable lymphadenopathy in bilateral upper extremities. SILT throughout both upper extremities, median/radial/ulnar/musculocutaneous/axillary nerve motor and sensory intact (except for " "abnormalities noted in focused musculoskeletal exam section below).     Neck exam: Full range of motion of the neck in flexion/extension and rotational movements. No significant areas of tenderness to palpation in the neck.    On exam of bilateral upper extremities, limited range of motion of both shoulders, more so on the left.  Forward flexion about 100, external Tatian of 20, external Tatian to the side.  Significant weakness with strength testing bilaterally secondary to pain.  Audible crepitus with range of motion of both shoulders.    Imaging: Radiographs of bilateral shoulders were performed today.  Grossly interpreted by myself.  She actually has identical findings on both shoulders.  Loss of glenohumeral joint space with osteophyte formation.      Assessment: Bilateral shoulder arthritis    Plan: Patient's symptoms, exam and imaging findings are consistent with end stage glenohumeral osteoarthritis.  We discussed the natural history of this.  We discussed that with shoulder osteoarthritis, there is loss of cartilage on both the humerus and glenoid, bony erosion of the glenoid, and extra bone formation called osteophytes on the humerus and glenoid.  We discussed that there are different patterns of arthritis.  Conservative treatment for glenohumeral arthritis is limited, and can include activity modification, injections and physical therapy, although with end stage bone on bone arthritis the benefits of physical therapy may be limited. The final option would be to have a shoulder replacement. After discussion, the patient would like to proceed with injecitons.    Injection was performed, please see separate procedure note, patient tolerated well.     Patient ID: Avani Abel \"[id number]\" is a 84 y.o. female.    L Inj/Asp: bilateral glenohumeral on 4/11/2025 12:25 PM  Indications: pain  Details: 22 G needle, anterior approach  Medications (Right): 80 mg triamcinolone acetonide 40 mg/mL; 5 mL lidocaine 10 " mg/mL (1 %)  Medications (Left): 80 mg triamcinolone acetonide 40 mg/mL; 5 mL lidocaine 10 mg/mL (1 %)  Outcome: tolerated well, no immediate complications  Procedure, treatment alternatives, risks and benefits explained, specific risks discussed. Consent was given by the patient. Immediately prior to procedure a time out was called to verify the correct patient, procedure, equipment, support staff and site/side marked as required. Patient was prepped and draped in the usual sterile fashion.             Dragon software was used to dictate this note, please be aware that minor errors in transcription may be present.    Simeon Will MD    Shoulder/Elbow Surgery  [facility]

## 2025-07-09 ENCOUNTER — APPOINTMENT (OUTPATIENT)
Dept: PRIMARY CARE | Facility: CLINIC | Age: 85
End: 2025-07-09
Payer: COMMERCIAL

## 2025-07-18 ENCOUNTER — APPOINTMENT (OUTPATIENT)
Dept: ORTHOPEDIC SURGERY | Facility: CLINIC | Age: 85
End: 2025-07-18
Payer: MEDICARE

## 2025-07-18 VITALS — BODY MASS INDEX: 26.93 KG/M2 | WEIGHT: 152 LBS | HEIGHT: 63 IN

## 2025-07-18 DIAGNOSIS — M25.511 CHRONIC PAIN OF BOTH SHOULDERS: ICD-10-CM

## 2025-07-18 DIAGNOSIS — M19.012 ARTHRITIS OF BOTH SHOULDERS: ICD-10-CM

## 2025-07-18 DIAGNOSIS — M19.011 ARTHRITIS OF BOTH SHOULDERS: ICD-10-CM

## 2025-07-18 DIAGNOSIS — M25.512 CHRONIC PAIN OF BOTH SHOULDERS: ICD-10-CM

## 2025-07-18 DIAGNOSIS — G89.29 CHRONIC PAIN OF BOTH SHOULDERS: ICD-10-CM

## 2025-07-18 DIAGNOSIS — M19.011 ARTHRITIS OF BOTH SHOULDERS: Primary | ICD-10-CM

## 2025-07-18 DIAGNOSIS — M19.012 ARTHRITIS OF BOTH SHOULDERS: Primary | ICD-10-CM

## 2025-07-18 PROCEDURE — 20610 DRAIN/INJ JOINT/BURSA W/O US: CPT | Performed by: STUDENT IN AN ORGANIZED HEALTH CARE EDUCATION/TRAINING PROGRAM

## 2025-07-18 PROCEDURE — 99214 OFFICE O/P EST MOD 30 MIN: CPT | Performed by: STUDENT IN AN ORGANIZED HEALTH CARE EDUCATION/TRAINING PROGRAM

## 2025-07-18 PROCEDURE — 1036F TOBACCO NON-USER: CPT | Performed by: STUDENT IN AN ORGANIZED HEALTH CARE EDUCATION/TRAINING PROGRAM

## 2025-07-18 RX ORDER — TRIAMCINOLONE ACETONIDE 40 MG/ML
80 INJECTION, SUSPENSION INTRA-ARTICULAR; INTRAMUSCULAR
Status: COMPLETED | OUTPATIENT
Start: 2025-07-18 | End: 2025-07-18

## 2025-07-18 RX ORDER — LIDOCAINE HYDROCHLORIDE 10 MG/ML
5 INJECTION, SOLUTION INFILTRATION; PERINEURAL
Status: COMPLETED | OUTPATIENT
Start: 2025-07-18 | End: 2025-07-18

## 2025-07-18 RX ADMIN — TRIAMCINOLONE ACETONIDE 80 MG: 40 INJECTION, SUSPENSION INTRA-ARTICULAR; INTRAMUSCULAR at 12:51

## 2025-07-18 RX ADMIN — LIDOCAINE HYDROCHLORIDE 5 ML: 10 INJECTION, SOLUTION INFILTRATION; PERINEURAL at 12:51

## 2025-07-18 ASSESSMENT — PAIN SCALES - GENERAL: PAINLEVEL_OUTOF10: 5 - MODERATE PAIN

## 2025-07-18 ASSESSMENT — PAIN - FUNCTIONAL ASSESSMENT: PAIN_FUNCTIONAL_ASSESSMENT: 0-10

## 2025-07-18 NOTE — PROGRESS NOTES
CHIEF COMPLAINT:   Chief Complaint   Patient presents with    Left Shoulder - Pain    Right Shoulder - Pain    Right Hand - Pain       History: 84 y.o. female presents to the office today for Bilateral shoulder pain with increased pain in the left. Patient had an injection in both shoulders with some relief. Patient would like to discuss other treatment options. Patient is also having pain in her right hand. Denies injury or accident.     4/11/2025: Pain has been ongoing, and radiates into her neck and down her arm. Does have numbness and tingling in her shoulders. Tylenol provides no improvement but patient does take prescription medication with little improvement.  Has difficulty grabbing items. XR done 11/15/2024    Past medical history, past surgical history, medications, allergies, family history, social history, and review of systems were reviewed today.    A 12 point review of systems was negative other than as stated in the HPI.    Past Medical History:   Diagnosis Date    Other bursitis of elbow, right elbow 09/25/2017    Radiohumeral bursitis of both elbows    Other cervical disc degeneration, unspecified cervical region     Degeneration of cervical intervertebral disc    Other conditions influencing health status     Mammogram    Other intervertebral disc degeneration, lumbar region     Disc degeneration, lumbar    Pain in left hip 05/25/2016    Left hip pain    Stage 3a chronic kidney disease (Multi) 01/03/2024        Allergies   Allergen Reactions    Aspirin Other    Barbiturates Headache    Chloroquine Unknown    Dye Unknown     IVP DYE    Meloxicam Headache    Shellfish Derived Hives    Sulfa (Sulfonamide Antibiotics) Other    Adhesive Tape-Silicones Rash        Past Surgical History:   Procedure Laterality Date    APPENDECTOMY  11/15/2013    Appendectomy    CHOLECYSTECTOMY  11/15/2013    Cholecystectomy    TONSILLECTOMY  11/15/2013    Tonsillectomy    TOTAL ABDOMINAL HYSTERECTOMY W/ BILATERAL  SALPINGOOPHORECTOMY  11/15/2013    Total Abdominal Hysterectomy With Removal Of Both Ovaries    TOTAL KNEE ARTHROPLASTY  11/15/2013    Knee Replacement        Family History   Problem Relation Name Age of Onset    Diabetes Mother      Thyroid cancer Mother      Dementia Mother      Breast cancer Sister      Hashimoto's thyroiditis Sister      Thyroid cancer Sister          Social History     Socioeconomic History    Marital status:      Spouse name: Not on file    Number of children: Not on file    Years of education: Not on file    Highest education level: Not on file   Occupational History    Not on file   Tobacco Use    Smoking status: Never     Passive exposure: Current    Smokeless tobacco: Never   Vaping Use    Vaping status: Never Used   Substance and Sexual Activity    Alcohol use: Not Currently    Drug use: Never    Sexual activity: Not on file   Other Topics Concern    Not on file   Social History Narrative    Not on file     Social Drivers of Health     Financial Resource Strain: Not on file   Food Insecurity: Not on file   Transportation Needs: Not on file   Physical Activity: Not on file   Stress: Not on file   Social Connections: Not on file   Intimate Partner Violence: Not on file   Housing Stability: Not on file        CURRENT MEDICATIONS:   Current Outpatient Medications   Medication Sig Dispense Refill    acetaminophen (Tylenol 8 HOUR) 650 mg ER tablet Take 1 tablet (650 mg) by mouth every 8 hours if needed for mild pain (1 - 3). Do not crush, chew, or split.      b complex 0.4 mg tablet Take 1 tablet by mouth once daily.      cyclobenzaprine (Flexeril) 5 mg tablet Take 1 tablet (5 mg) by mouth as needed at bedtime for muscle spasms. 90 tablet 3    DULoxetine (Cymbalta) 30 mg DR capsule TAKE 1 CAPSULE BY MOUTH TWICE DAILY DO  NOT  CRUSH  OR  CHEW 180 capsule 0    gabapentin (Neurontin) 100 mg capsule Take 1 capsule (100 mg) by mouth 2 times a day. 180 capsule 1    hydrALAZINE (Apresoline) 50  "mg tablet Take 1 tablet (50 mg) by mouth 2 times a day. 180 tablet 1    hydrOXYzine HCL (Atarax) 25 mg tablet Take 1 tablet (25 mg) by mouth as needed at bedtime for itching. 90 tablet 1    lisinopril 20 mg tablet Take 2 tablets (40 mg) by mouth once daily. 180 tablet 3    metoprolol succinate XL (Toprol-XL) 50 mg 24 hr tablet Take 1 tablet (50 mg) by mouth once daily. Do not crush or chew. 90 tablet 3    multivitamin tablet Take by mouth.      predniSONE (Deltasone) 20 mg tablet Take 3 tabs (60mg) daily for 3 days, then take 2 tabs (40mg) daily for 3 days, then take 1 tab (20mg) daily for 3 days. 18 tablet 0    rOPINIRole (Requip) 0.5 mg tablet Take 1 tablet (0.5 mg) by mouth 3 times a day. 270 tablet 3    simvastatin (Zocor) 20 mg tablet Take 1 tablet (20 mg) by mouth once daily. 90 tablet 3     No current facility-administered medications for this visit.       Physical Examination:      6/6/2024     3:12 PM 9/13/2024    12:54 PM 10/14/2024     9:25 AM 11/15/2024    10:24 AM 4/3/2025    10:26 AM 4/3/2025    11:14 AM 4/3/2025    11:15 AM   Vitals   Systolic 142  126 145 118 160 160   Diastolic 80  72 88 74 80 88   BP Location      Right arm Left arm   Heart Rate 92 83 86 88 95     Resp   16  16     Height 1.6 m (5' 2.99\")  1.6 m (5' 3\") 1.6 m (5' 3\") 1.6 m (5' 3\")     Weight (lb) 157 153.6 154 153 151     BMI 27.82 kg/m2 27.22 kg/m2 27.28 kg/m2 27.1 kg/m2 26.75 kg/m2     BSA (m2) 1.78 m2 1.76 m2 1.76 m2 1.76 m2 1.74 m2     Visit Report Report Report Report Report Report Report Report      There is no height or weight on file to calculate BMI.    Well-appearing, appears stated age, pleasant and cooperative, appropriate mood and behavior. Height and weight reviewed. Alert and oriented x3.  Auditory function intact.  No acute distress.  Intact ocular function, JOHN, EOMI. Breathing is unlabored .  There is no evidence of jugular venous distension. Skin appearance is normal without evidence of rash or other lesions. " 2+ radial pulses bilaterally, fingers pink and wwp, good capillary refill, no pitting edema. No appreciable lymphadenopathy in bilateral upper extremities. SILT throughout both upper extremities, median/radial/ulnar/musculocutaneous/axillary nerve motor and sensory intact (except for abnormalities noted in focused musculoskeletal exam section below).     Neck exam: Full range of motion of the neck in flexion/extension and rotational movements. No significant areas of tenderness to palpation in the neck.    On exam of bilateral upper extremities, limited range of motion of both shoulders, more so on the left.  Forward flexion about 100, external Tatian of 20, external Tatian to the side.  Significant weakness with strength testing bilaterally secondary to pain.  Audible crepitus with range of motion of both shoulders.    Imaging: Radiographs of bilateral shoulders were reviewed today.  Grossly interpreted by myself.  She actually has identical findings on both shoulders.  Loss of glenohumeral joint space with osteophyte formation.      Assessment: Bilateral shoulder arthritis    Plan: Patient's symptoms, exam and imaging findings are consistent with end stage glenohumeral osteoarthritis.  We discussed the natural history of this.  We discussed that with shoulder osteoarthritis, there is loss of cartilage on both the humerus and glenoid, bony erosion of the glenoid, and extra bone formation called osteophytes on the humerus and glenoid.  We discussed that there are different patterns of arthritis.  Conservative treatment for glenohumeral arthritis is limited, and can include activity modification, injections and physical therapy, although with end stage bone on bone arthritis the benefits of physical therapy may be limited. The final option would be to have a shoulder replacement. After discussion, the patient would like to proceed with injecitons.    Patient is not interested in surgery.  Will get a referral in for  "pain management.  She is also having some hand pain will be referred for that    Injection was performed, please see separate procedure note, patient tolerated well.     Patient ID: Avani Abel \"[id number]\" is a 84 y.o. female.    L Inj/Asp: bilateral subacromial bursa on 7/18/2025 12:51 PM  Indications: pain  Details: 22 G needle, posterior approach  Medications (Right): 80 mg triamcinolone acetonide 40 mg/mL; 5 mL lidocaine 10 mg/mL (1 %)  Medications (Left): 80 mg triamcinolone acetonide 40 mg/mL; 5 mL lidocaine 10 mg/mL (1 %)  Outcome: tolerated well, no immediate complications  Procedure, treatment alternatives, risks and benefits explained, specific risks discussed. Consent was given by the patient. Immediately prior to procedure a time out was called to verify the correct patient, procedure, equipment, support staff and site/side marked as required. Patient was prepped and draped in the usual sterile fashion.             Dragon software was used to dictate this note, please be aware that minor errors in transcription may be present.    Simeon Will MD    Shoulder/Elbow Surgery  [facility]      "

## 2025-09-04 ENCOUNTER — TELEPHONE (OUTPATIENT)
Dept: PRIMARY CARE | Facility: CLINIC | Age: 85
End: 2025-09-04
Payer: COMMERCIAL

## 2025-09-04 DIAGNOSIS — G89.29 CHRONIC RIGHT SHOULDER PAIN: ICD-10-CM

## 2025-09-04 DIAGNOSIS — M25.511 CHRONIC RIGHT SHOULDER PAIN: ICD-10-CM

## 2025-09-04 DIAGNOSIS — I10 PRIMARY HYPERTENSION: ICD-10-CM

## 2025-09-04 DIAGNOSIS — F51.01 PRIMARY INSOMNIA: ICD-10-CM

## 2025-09-04 DIAGNOSIS — M25.512 CHRONIC LEFT SHOULDER PAIN: ICD-10-CM

## 2025-09-04 DIAGNOSIS — G89.29 CHRONIC LEFT SHOULDER PAIN: ICD-10-CM

## 2025-09-05 RX ORDER — DULOXETIN HYDROCHLORIDE 30 MG/1
30 CAPSULE, DELAYED RELEASE ORAL 2 TIMES DAILY
Qty: 180 CAPSULE | Refills: 0 | Status: SHIPPED | OUTPATIENT
Start: 2025-09-05

## 2025-09-05 RX ORDER — GABAPENTIN 100 MG/1
100 CAPSULE ORAL 2 TIMES DAILY
Qty: 180 CAPSULE | Refills: 1 | Status: SHIPPED | OUTPATIENT
Start: 2025-09-05 | End: 2026-03-04

## 2025-09-05 RX ORDER — HYDROXYZINE HYDROCHLORIDE 25 MG/1
25 TABLET, FILM COATED ORAL NIGHTLY PRN
Qty: 90 TABLET | Refills: 1 | Status: SHIPPED | OUTPATIENT
Start: 2025-09-05

## 2025-09-05 RX ORDER — HYDRALAZINE HYDROCHLORIDE 50 MG/1
50 TABLET, FILM COATED ORAL 2 TIMES DAILY
Qty: 180 TABLET | Refills: 1 | Status: SHIPPED | OUTPATIENT
Start: 2025-09-05 | End: 2026-09-05

## 2025-10-06 ENCOUNTER — APPOINTMENT (OUTPATIENT)
Dept: PRIMARY CARE | Facility: CLINIC | Age: 85
End: 2025-10-06
Payer: COMMERCIAL

## 2026-01-14 ENCOUNTER — APPOINTMENT (OUTPATIENT)
Dept: PRIMARY CARE | Facility: CLINIC | Age: 86
End: 2026-01-14
Payer: COMMERCIAL

## 2026-04-15 ENCOUNTER — APPOINTMENT (OUTPATIENT)
Dept: PRIMARY CARE | Facility: CLINIC | Age: 86
End: 2026-04-15
Payer: COMMERCIAL